# Patient Record
Sex: FEMALE | Race: BLACK OR AFRICAN AMERICAN | Employment: UNEMPLOYED | URBAN - METROPOLITAN AREA
[De-identification: names, ages, dates, MRNs, and addresses within clinical notes are randomized per-mention and may not be internally consistent; named-entity substitution may affect disease eponyms.]

---

## 2017-01-03 ENCOUNTER — APPOINTMENT (OUTPATIENT)
Dept: GENERAL RADIOLOGY | Age: 57
End: 2017-01-03
Attending: PHYSICAL MEDICINE & REHABILITATION
Payer: MEDICAID

## 2017-01-03 ENCOUNTER — SURGERY (OUTPATIENT)
Age: 57
End: 2017-01-03

## 2017-01-05 ENCOUNTER — OFFICE VISIT (OUTPATIENT)
Dept: ORTHOPEDIC SURGERY | Age: 57
End: 2017-01-05

## 2017-01-05 VITALS
SYSTOLIC BLOOD PRESSURE: 155 MMHG | OXYGEN SATURATION: 98 % | TEMPERATURE: 97.7 F | BODY MASS INDEX: 47.09 KG/M2 | WEIGHT: 293 LBS | RESPIRATION RATE: 18 BRPM | DIASTOLIC BLOOD PRESSURE: 78 MMHG | HEIGHT: 66 IN | HEART RATE: 82 BPM

## 2017-01-05 DIAGNOSIS — M54.5 LOW BACK PAIN, UNSPECIFIED BACK PAIN LATERALITY, UNSPECIFIED CHRONICITY, WITH SCIATICA PRESENCE UNSPECIFIED: ICD-10-CM

## 2017-01-05 DIAGNOSIS — D18.00 HEMANGIOMA: Primary | Chronic | ICD-10-CM

## 2017-01-05 DIAGNOSIS — M47.816 FACET ARTHROPATHY, LUMBAR: Chronic | ICD-10-CM

## 2017-01-05 RX ORDER — OXYCODONE AND ACETAMINOPHEN 7.5; 325 MG/1; MG/1
TABLET ORAL
Qty: 60 TAB | Refills: 0 | Status: SHIPPED | OUTPATIENT
Start: 2017-01-05 | End: 2017-02-03 | Stop reason: SDUPTHER

## 2017-01-05 RX ORDER — AZITHROMYCIN 250 MG/1
500 TABLET, FILM COATED ORAL
COMMUNITY
End: 2017-04-26 | Stop reason: ALTCHOICE

## 2017-01-05 RX ORDER — PRAVASTATIN SODIUM 20 MG/1
40 TABLET ORAL
COMMUNITY

## 2017-01-05 RX ORDER — PREDNISONE 5 MG/1
5 TABLET ORAL
COMMUNITY
Start: 2012-01-28 | End: 2017-05-15

## 2017-01-05 RX ORDER — KETOROLAC TROMETHAMINE 15 MG/ML
60 INJECTION, SOLUTION INTRAMUSCULAR; INTRAVENOUS ONCE
Qty: 1 VIAL | Refills: 0
Start: 2017-01-05 | End: 2017-01-05

## 2017-01-05 RX ORDER — BENZONATATE 200 MG/1
CAPSULE ORAL
Refills: 0 | COMMUNITY
Start: 2016-12-02 | End: 2017-04-26 | Stop reason: ALTCHOICE

## 2017-01-05 RX ORDER — HYDROCODONE POLISTIREX AND CHLORPHENIRAMINE POLISTIREX 10; 8 MG/5ML; MG/5ML
2.5 SUSPENSION, EXTENDED RELEASE ORAL
COMMUNITY
Start: 2012-01-28 | End: 2017-04-26 | Stop reason: ALTCHOICE

## 2017-01-05 RX ORDER — ASPIRIN 81 MG/1
81 TABLET ORAL
COMMUNITY

## 2017-01-05 RX ORDER — DIAZEPAM 10 MG/1
TABLET ORAL
Qty: 1 TAB | Refills: 0 | Status: SHIPPED | OUTPATIENT
Start: 2017-01-05 | End: 2017-05-15

## 2017-01-05 RX ORDER — PRAVASTATIN SODIUM 40 MG/1
TABLET ORAL
Refills: 0 | COMMUNITY
Start: 2016-12-07 | End: 2017-05-15

## 2017-01-05 RX ORDER — PREDNISONE 5 MG/ML
5 SOLUTION ORAL
COMMUNITY
End: 2017-05-15

## 2017-01-05 RX ORDER — PREDNISONE 20 MG/1
20 TABLET ORAL
COMMUNITY
End: 2017-05-15

## 2017-01-05 RX ORDER — GLIPIZIDE 2.5 MG/1
2.5 TABLET, EXTENDED RELEASE ORAL
COMMUNITY

## 2017-01-05 RX ORDER — AMLODIPINE BESYLATE 5 MG/1
5 TABLET ORAL
COMMUNITY

## 2017-01-05 RX ORDER — HYDROCHLOROTHIAZIDE 25 MG/1
25 TABLET ORAL
COMMUNITY

## 2017-01-05 NOTE — PROGRESS NOTES
After obtaining consent, and per orders of Dr. John Sanchez, injection of TORADOL 60 MG given by Florinda Castellanos LPN. Patient instructed to report any adverse reaction to me immediately. PT TOLERATED WELL AND WITHOUT INCIDENT.

## 2017-01-05 NOTE — PROGRESS NOTES
Flor Espinal entered per Norberto Moreno as documented on blue sheet:MRI l spine with and w/o contrast  For increase back pain, Toradol 60 mg IM injection, Percocet 7.5 - 325 mg 1 tab PO every day-BID PRN severe pain

## 2017-01-05 NOTE — PROGRESS NOTES
[address]  [address], [address]  [place], [facility] [address]  Phone: [phone]  Fax: [phone]        Emma Hutton  : 1960  PCP: Ruth Buenrostro MD    PROGRESS NOTE      ASSESSMENT AND PLAN    Tim Gordon was seen today for back pain. 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 1.  Progressive low lumbar pain unable to ambulate,  unresponsive to opioids. Need to re-evaluate atypical L5 findings for progression. Informed to not take Diclofenac and Motrin together. 2. DC Tramadol due to no benefit. 3. Lumbar spine MRI. 4. Increase Percocet to 7.5-325 mg  5. Rx for Valium for her MRI. 6.  PCP f/u for DM     Follow-up Disposition: Not on File    Risks and benefits of ongoing opiate therapy have been reviewed with the patient.  is appropriate. UDS results have been consistent. No pain behaviors. Pt has a good risk to benefit ratio which allows the pt to function in a home environment without side effects. HISTORY OF PRESENT ILLNESS  Jose Medrano is a 60 y.o. female. Pt presents to the office for a f/u visit fo rincreasing LBP. She was scheduled for spinal blocks earlier this week  but had to be postponed due to elevated blood sugars. Pt presents to the office today with increasing back pain. Pt denies any specific incident or injury that caused their pain. She notes that for the past couple of weeks her pain has been increasing. She denies doing very much during the holidays to cause her pain to increase. Pt notes that she has been bed ridden due to the intensity of her pain. Pt notes that she is unable to stand, sit, or stand for very long. Pt reports that she has been miserable for the past few weeks without any relief. Pt notes that she has some mild relief while laying down. Her back pain mainly stays in her back but she continues to have RT knee pain. She has a short standing and walking tolerance due to the current intensity of her pain. Her pain is constant throughout the day. She denies any weakness in her BLE. She denies any saddle paresthesia. Pt takes Percocet 5-325 mg BID without much benefit. Pt takes Tramadol 100 mg BID without any benefit. Pt denies any dizziness, confusion, uncontrolled constipation, and cravings due to controlled substances. Pt takes Diclofenac and Motrin without benefit. Denies persistent fevers, chills, weight changes, neurogenic bowel or bladder symptoms. Pt denies recent ED visits or hospitalizations. Pt denies any recent GI ulcers, bleeds or renal dysfucntion. PMHx of DM and hemangioma w/ Atypia at L5    Pain Assessment  1/5/2017   Location of Pain Back   Severity of Pain 7   Quality of Pain Sharp;Dull;Aching   Duration of Pain Persistent   Frequency of Pain Constant   Aggravating Factors Bending;Squatting;Standing;Walking;Stairs   Limiting Behavior Yes   Relieving Factors Rest   Result of Injury No       PAST MEDICAL HISTORY   Past Medical History   Diagnosis Date    Arthritis     Diabetes ([address]     Hypertension     Morbid obesity ([address]     Osteoarthritis of both knees        Past Surgical History   Procedure Laterality Date    Hx gyn       Menopausal   .      MEDICATIONS    Current Outpatient Prescriptions   Medication Sig Dispense Refill    amLODIPine (NORVASC) 5 mg tablet 5 mg.       aspirin delayed-release 81 mg tablet 81 mg.      chlorpheniramine-HYDROcodone (TUSSIONEX PENNKINETIC ER) 10-8 mg/5 mL suspension 2.5 mL.  glipiZIDE SR (GLUCOTROL XL) 2.5 mg CR tablet 2.5 mg.      hydroCHLOROthiazide (HYDRODIURIL) 25 mg tablet 25 mg.  predniSONE (DELTASONE) 20 mg tablet 20 mg.      benzonatate (TESSALON) 200 mg capsule take 1 capsule by mouth three times a day if needed  0    pravastatin (PRAVACHOL) 40 mg tablet   0    gabapentin (NEURONTIN) 100 mg capsule take 1 capsule by mouth three times a day if needed for pain  0    BD INSULIN PEN NEEDLE UF MINI 31 gauge x 3/16\" ndle   0    [START ON 2/15/2017] oxyCODONE-acetaminophen (PERCOCET) 5-325 mg per tablet 1 tab PO every day prn pain 30 Tab 0    traMADol (ULTRAM) 50 mg tablet Take 1-2 Tabs by mouth three (3) times daily as needed for Pain. Max Daily Amount: 300 mg. 180 Tab 2    diclofenac EC (VOLTAREN) 75 mg EC tablet Take 1 Tab by mouth two (2) times a day.  60 Tab 2    gabapentin (NEURONTIN) 300 mg capsule take 1 capsule by mouth three times a day if needed for pain 90 Cap 1    albuterol (PROVENTIL VENTOLIN) 2.5 mg /3 mL (0.083 %) nebulizer solution   0    ipratropium (ATROVENT) 0.02 % nebulizer solution INHALE CONTENTS OF 1 VIAL IN NEBULIZER FOUR TIMES A DAY  0    LANTUS SOLOSTAR 100 unit/mL (3 mL) pen inject 60 units subcutaneously at bedtime  0    terconazole (TERAZOL 3) 0.8 % vaginal cream insert 1 applicatorful vaginally once daily at bedtime for 3 days  0    clotrimazole (LOTRIMIN) 1 % topical cream apply sparingly to affected area twice a day if needed  0    MUCINEX 600 mg ER tablet   0    BYETTA 5 mcg/dose (250 mcg/mL) 1.2 mL injection   0    CONTOUR METER misc use as directed  0    BD INSULIN SYRINGE ULTRA-FINE 0.5 mL 31 gauge x 5/16 syrg USE THREE TIMES DAILY AS DIRECTED  0    BD INSULIN PEN NEEDLE UF ORIG 29 gauge x 1/2\" ndle USE 4 TIMES DAILY  0    ASCENSIA CONTOUR strip USE 1 STRIP 3 TIMES DAILY  0    fluticasone (FLONASE) 50 mcg/actuation nasal spray instill 1 spray into each nostril twice a day  0    polyethylene glycol (MIRALAX) 17 gram/dose powder MIX 17 GM IN  8 OZ OF LIQUID AND DRINL 1 TO 2 TIMES DAILY FOR CONSTIPATION  0    diazepam (VALIUM) 10 mg tablet Take 1 tab PO 30 minutes pre-procedure. 1 Tab 0    NOVOLOG FLEXPEN 100 unit/mL inpn   0    metoprolol succinate (TOPROL-XL) 25 mg XL tablet   0    ADVAIR DISKUS 250-50 mcg/dose diskus inhaler   0    atorvastatin (LIPITOR) 20 mg tablet Take 20 mg by mouth daily. 0    PROAIR HFA 90 mcg/actuation inhaler   0    Aspirin, Buffered 81 mg tab Take  by mouth.  cetirizine (ZYRTEC) 10 mg tablet Take 10 mg by mouth nightly.  montelukast (SINGULAIR) 10 mg tablet Take 10 mg by mouth daily.  ranitidine (ZANTAC) 150 mg tablet Take 150 mg by mouth two (2) times a day.  chlorthalidone (HYGROTEN) 25 mg tablet Take 25 mg by mouth daily.  lisinopril (PRINIVIL, ZESTRIL) 40 mg tablet Take 40 mg by mouth daily.  ibuprofen (MOTRIN IB) 200 mg tablet Take  by mouth.  metFORMIN (GLUCOPHAGE) 500 mg tablet Take 500 mg by mouth two (2) times daily (with meals).  azithromycin (ZITHROMAX) 250 mg tablet 500 mg.  pravastatin (PRAVACHOL) 20 mg tablet 40 mg.  predniSONE (DELTASONE) 5 mg tablet 5 mg.  predniSONE 5 mg/5 mL oral soultion 5 mg. ALLERGIES  Allergies   Allergen Reactions    Aspirin Nausea and Vomiting     itching    Vicodin [Hydrocodone-Acetaminophen] Itching          SOCIAL HISTORY    Social History     Social History    Marital status: UNKNOWN     Spouse name: N/A    Number of children: N/A    Years of education: N/A     Occupational History    Not on file.      Social History Main Topics    Smoking status: Former Smoker    Smokeless tobacco: Never Used    Alcohol use No    Drug use: No    Sexual activity: Not Currently     Other Topics Concern    Not on file     Social History Narrative    ** Merged History Encounter **            FAMILY HISTORY  Family History   Problem Relation Age of Onset    Diabetes Mother     Arthritis-rheumatoid Mother        REVIEW OF SYSTEMS  Review of Systems   Constitutional: Negative for chills, fever and weight loss. Respiratory: Negative for shortness of breath. Cardiovascular: Negative for chest pain. Gastrointestinal: Negative for constipation. Negative for fecal incontinence   Genitourinary: Negative for dysuria. Negative for urinary incontinence   Musculoskeletal:        Per HPI   Skin: Negative for rash. Neurological: Negative for dizziness, tingling, tremors, focal weakness and headaches. Endo/Heme/Allergies: Does not bruise/bleed easily. Psychiatric/Behavioral: The patient does not have insomnia. PHYSICAL EXAMINATION  Visit Vitals    /78    Pulse 82    Temp 97.7 °F (36.5 °C) (Oral)    Resp 18    Ht 5' 6\" (1.676 m)    Wt 300 lb (136.1 kg)    SpO2 98%    BMI 48.42 kg/m2         Accompanied by self. Constitutional:  Well developed, well nourished, in no acute distress. Psychiatric: Affect and mood are appropriate. Integumentary: No rashes or abrasions noted on exposed areas. Cardiovascular/Peripheral Vascular: Intact l pulses. No peripheral edema is noted. Lymphatic:  No evidence of lymphedema. No cervical lymphadenopathy. SPINE/MUSCULOSKELETAL EXAM    Lumbar spine:  No rash, ecchymosis, or gross obliquity. No fasciculations. No focal atrophy is noted. Tenderness to palpation L5-S1. Tenderness to palpation of bilateral PSIS. No tenderness to palpation at the sciatic notch. SI joints non-tender. Trochanters non tender. Sensation grossly intact to light touch. MOTOR:       Hip Flex  Quads Hamstrings Ankle DF EHL Ankle PF   Right +4/5 +4/5 +4/5 +4/5 +4/5 +4/5   Left +4/5 +4/5 +4/5 +4/5 +4/5 +4/5     Straight Leg raise negative. Ambulation without assistive device. FWB.       Written by Naye Sanabria as dictated by Gaynel Favre, MD.    Ms. Gray Puckett may have a reminder for a \"due or due soon\" health maintenance. I have asked that she contact her primary care provider for follow-up on this health maintenance.

## 2017-01-12 RX ORDER — GABAPENTIN 300 MG/1
CAPSULE ORAL
Qty: 90 CAP | Refills: 1 | OUTPATIENT
Start: 2017-01-12

## 2017-01-12 RX ORDER — GABAPENTIN 300 MG/1
300 CAPSULE ORAL 3 TIMES DAILY
Qty: 90 CAP | Refills: 2 | Status: SHIPPED | OUTPATIENT
Start: 2017-01-12 | End: 2017-04-04 | Stop reason: SDUPTHER

## 2017-01-12 NOTE — TELEPHONE ENCOUNTER
Order entered and sent to pharmacy on file per BLACK Samson. Called patient and left message on unidentified voice mail, informing patient that Rx was approved and sent to pharmacy on file and to call facility with any further questions. No further action required.

## 2017-01-12 NOTE — TELEPHONE ENCOUNTER
Patient was last seen by Dr. Mercedes Cisneros on 01/05/2017,   Follow up on 03/02/2017 with Dr. Mercedes Cisneros. Gabapentin last prescribed by Juan Young on 10/06/2016. Please review and advise.

## 2017-02-01 DIAGNOSIS — M47.816 FACET ARTHROPATHY, LUMBAR: Chronic | ICD-10-CM

## 2017-02-01 NOTE — TELEPHONE ENCOUNTER
Patient wants MRI done at [facility]. Please  fax order to [facility]. Also need refill of Tramadol and her Percocet. Patient  at [facility] [place].  She can be reached at [phone]

## 2017-02-02 ENCOUNTER — DOCUMENTATION ONLY (OUTPATIENT)
Dept: ORTHOPEDIC SURGERY | Age: 57
End: 2017-02-02

## 2017-02-02 RX ORDER — TRAMADOL HYDROCHLORIDE 50 MG/1
50-100 TABLET ORAL
Qty: 180 TAB | Refills: 2 | OUTPATIENT
Start: 2017-02-02

## 2017-02-02 RX ORDER — OXYCODONE AND ACETAMINOPHEN 5; 325 MG/1; MG/1
TABLET ORAL
Qty: 30 TAB | Refills: 0 | OUTPATIENT
Start: 2017-03-14

## 2017-02-02 NOTE — PROGRESS NOTES
MRI Lumbar Spine w and w/o is scheduled at [facility], [phone], on 02/21/17, arrive 10:30AM, test 11:00AM. [facility] pre-authorization is [id number], effective 02/20/17-04/06/17

## 2017-02-02 NOTE — TELEPHONE ENCOUNTER
Please see documentation regarding refill. Pt calling for status update.  pls call pt she has to make transportation arrangements

## 2017-02-02 NOTE — TELEPHONE ENCOUNTER
According to Dr Azalea Brooks last OV note, the tramadol has been d/c'ed and the percocet was increased to 7.5/325  #60. Please pend up the correct dose. Also, she was given a rx for percocet 5/325 #30 on 12/1/16 with dnf date 2/15/17. Please have her bring that rx back when she picks up the new rx.

## 2017-02-02 NOTE — TELEPHONE ENCOUNTER
Kayla, could you please assist with sending this patient's order to [facility] 112 is out for the remainder of the week and this patient's MRI order has been in the system for a little bit already. Thanks so much.

## 2017-02-03 RX ORDER — OXYCODONE AND ACETAMINOPHEN 7.5; 325 MG/1; MG/1
TABLET ORAL
Qty: 60 TAB | Refills: 0 | Status: SHIPPED | OUTPATIENT
Start: 2017-02-03 | End: 2017-02-28 | Stop reason: SDUPTHER

## 2017-02-03 RX ORDER — OXYCODONE AND ACETAMINOPHEN 7.5; 325 MG/1; MG/1
TABLET ORAL
Qty: 60 TAB | Refills: 0 | Status: CANCELLED | OUTPATIENT
Start: 2017-02-03

## 2017-02-03 NOTE — TELEPHONE ENCOUNTER
Pt has only been seen by Dr. Rebecca España and BLACK Mxi.  RX Percocet 7.5-325 mg pend for approval.

## 2017-02-03 NOTE — TELEPHONE ENCOUNTER
Patient called again to check status of refill request - please advise as soon as possible at [phone]

## 2017-02-03 NOTE — TELEPHONE ENCOUNTER
Per CC, last RX Percocet given was Percocet 7.5-325mg 1 tab PO every day-bid #60 written on last office visit of 01/05/17 with this start date, which would mean pt is about to run out of this med. Pt was given RX Percocet 5-325mg on 12/01/16 with DNF 02/15/17 prior to increase at 01/05/17 office visit. Called pt and she states she would rather wait for the Percocet 7.5-325mg refill script to be signed instead of getting the Percocet 5-325mg filled. Would you like a vorb done to re-order Percocet 7.5-325mg and place in cabinet at [facility] #1 for signature on Monday?

## 2017-02-03 NOTE — TELEPHONE ENCOUNTER
VENANCIOB ENTERED PER DR. Chance Castellanos FOR RE-ORDER OF PERCOCET 7.5-325 MG AS LAST WRITTEN. Placed in cabinet at [facility] #1 for signature. Pt to be called for  once signed.

## 2017-02-03 NOTE — TELEPHONE ENCOUNTER
Not sure how/why she was given a 5/325 and a 7.5/325 Rx for Percocet last visit. We can call her pharmacy and authorize her to get her #30 5/325 Percocet filled today and I will sign the RF of 7.5 on Monday.

## 2017-02-06 NOTE — TELEPHONE ENCOUNTER
Called patient to inform her that RX is at the front ready for . Patient verbalized understanding and will come to the office to .

## 2017-02-22 ENCOUNTER — TELEPHONE (OUTPATIENT)
Dept: ORTHOPEDIC SURGERY | Age: 57
End: 2017-02-22

## 2017-02-22 DIAGNOSIS — M47.816 FACET ARTHROPATHY, LUMBAR: Chronic | ICD-10-CM

## 2017-02-22 NOTE — TELEPHONE ENCOUNTER
Pt requesting a RX for her Percocet and Tramadol because she will be out soon and her MRI is not scheduled until mid April due to her insurance pushing it back. Please advise pt at [phone].

## 2017-02-23 NOTE — TELEPHONE ENCOUNTER
Patient checking on meds request for March and April MRI is not till 04/21 Felix White. Also has issues with transportation to get to office.  Please call her today at [phone]

## 2017-02-27 NOTE — TELEPHONE ENCOUNTER
02/27/17 patient checking as she has not heard from the office ref her med request of 02/22/17.   Please call her today at [phone]

## 2017-02-28 RX ORDER — OXYCODONE AND ACETAMINOPHEN 7.5; 325 MG/1; MG/1
TABLET ORAL
Qty: 60 TAB | Refills: 0 | Status: SHIPPED | OUTPATIENT
Start: 2017-02-28 | End: 2017-03-27 | Stop reason: SDUPTHER

## 2017-02-28 NOTE — TELEPHONE ENCOUNTER
Pt called for status update of medication request from a week ago. Pt states she still has a few pills left, but she needs to make transportation arrgmnts now to get here.

## 2017-02-28 NOTE — TELEPHONE ENCOUNTER
Spoke with patient, stated MRI is not until April because that is when her insurance was willing to cover it. Stated if she the MRI prior to the middle of April she would have to pay out of pocket. Rx for Percocet 7.5mg has been printed and will be signed on 3/1/17 by Dr. Cooper Vasquez.  Patient will be notified when ready for pick-up

## 2017-02-28 NOTE — TELEPHONE ENCOUNTER
This message was routed to [facility] on 02/22/17 but no response can you please advise patient has called for status update.

## 2017-02-28 NOTE — TELEPHONE ENCOUNTER
Why is her MRI scheduled so far out in April ? She should be able to get in for sooner study. Ok to fill Percocet 7.5 one po every day-bid prn severe pain. #60. Last visit she reported that the Tramadol was not helping at all, so she does not need to take it since the Percocet is much stronger.

## 2017-03-27 DIAGNOSIS — M47.816 FACET ARTHROPATHY, LUMBAR: Chronic | ICD-10-CM

## 2017-03-27 RX ORDER — OXYCODONE AND ACETAMINOPHEN 7.5; 325 MG/1; MG/1
TABLET ORAL
Qty: 60 TAB | Refills: 0 | Status: SHIPPED | OUTPATIENT
Start: 2017-03-30 | End: 2017-04-26 | Stop reason: SDUPTHER

## 2017-03-27 NOTE — TELEPHONE ENCOUNTER
Last Visit: 2017 with MD Jalen Martinez    Next Appointment: 2017 with MD Jalen Martinez   Previous Refill Encounters: 2017 per MD Jalen Martinez #60     Requested Prescriptions     Pending Prescriptions Disp Refills    oxyCODONE-acetaminophen (PERCOCET) 7.5-325 mg per tablet 60 Tab 0     Si tab PO every day-BID PRN severe pain . ..  DO NOT FILL BEFORE 2017

## 2017-03-29 NOTE — TELEPHONE ENCOUNTER
Patient checking on when she can get her prescription requested 03/27/17.   Please call her back at [phone]

## 2017-04-06 ENCOUNTER — TELEPHONE (OUTPATIENT)
Dept: ORTHOPEDIC SURGERY | Age: 57
End: 2017-04-06

## 2017-04-06 ENCOUNTER — DOCUMENTATION ONLY (OUTPATIENT)
Dept: ORTHOPEDIC SURGERY | Age: 57
End: 2017-04-06

## 2017-04-06 DIAGNOSIS — M47.816 FACET ARTHROPATHY, LUMBAR: Chronic | ICD-10-CM

## 2017-04-06 NOTE — TELEPHONE ENCOUNTER
Last office visit 01/05/2017 with Dr. Hilda Florence. Per [facility], pt last Percocet 7.5-325 mg on 03/06/17 written on 02/28/17 #60/30-day supply. Per CC RX written 03/27/17 with DNF 03/30/17. Please double check.

## 2017-04-06 NOTE — TELEPHONE ENCOUNTER
MRI Lumbar spine w and w/o is scheduled at [facility] on 04/29/17. (The previous scheduling had to be cancelled because [facility] was not going to cover the MRI. She was required to wait an add'l 3 months before she could proceed with the test.)    Ms. Moriah Edward is scheduled for her follow-up appointment with Dr. Armando Howard on 05/15/17. She is aware this is an early request but she would like to have her Percocet for May approved for a refill due to her return date. She states she has another prescription she will be picking up and would like to  the May request as well. (As a side note, Ms. Moriah Edward in enrolled in school and is limited with travel opportunities due to the class hours.) Please call patient to advise. Thank you.

## 2017-04-06 NOTE — PROGRESS NOTES
MRI Lumbar spine w and w/o is scheduled at [facility], [phone], on 04/29/17, arrive 9:00AM, test 9:30AM. [facility] pre-authorization is [id number], effective 04/26/17-06/10/17

## 2017-04-07 NOTE — TELEPHONE ENCOUNTER
Patient returned call to us regarding medication - states she's going to run out of medication prior to upcoming appointment 5/15 and we were not able to offer her appointment prior to this date. Will she be able to get medication prior to this date?

## 2017-04-10 NOTE — TELEPHONE ENCOUNTER
According to the chart, the pt cancelled her 3/2/17 and 4/26/17 appts with Dr Lexy Jacobo.   We cannot give her any opioids for May as she was last seen in Jan.

## 2017-04-10 NOTE — TELEPHONE ENCOUNTER
Called pt and informed of NP Michelle Connors's message. Per pt, she cancelled the 04/26/2017 appt due to insurance not paying for MRI so she did not see the need to keep the appt since the MRI would not be done prior to that date. Pt asked to see NP for medication refill. Transferred to  to assist pt with scheduling with NP's next available.

## 2017-04-17 ENCOUNTER — HOSPITAL ENCOUNTER (EMERGENCY)
Age: 57
Discharge: HOME | End: 2017-04-17
Attending: INTERNAL MEDICINE | Admitting: INTERNAL MEDICINE
Payer: MEDICAID

## 2017-04-17 VITALS
BODY MASS INDEX: 45.99 KG/M2 | OXYGEN SATURATION: 99 % | TEMPERATURE: 98.6 F | RESPIRATION RATE: 16 BRPM | DIASTOLIC BLOOD PRESSURE: 88 MMHG | HEART RATE: 80 BPM | SYSTOLIC BLOOD PRESSURE: 175 MMHG | WEIGHT: 293 LBS | HEIGHT: 67 IN

## 2017-04-17 DIAGNOSIS — M25.561 CHRONIC PAIN OF BOTH KNEES: ICD-10-CM

## 2017-04-17 DIAGNOSIS — M25.562 CHRONIC PAIN OF BOTH KNEES: ICD-10-CM

## 2017-04-17 DIAGNOSIS — M54.50 CHRONIC MIDLINE LOW BACK PAIN WITHOUT SCIATICA: Primary | ICD-10-CM

## 2017-04-17 DIAGNOSIS — G89.29 CHRONIC MIDLINE LOW BACK PAIN WITHOUT SCIATICA: Primary | ICD-10-CM

## 2017-04-17 DIAGNOSIS — G89.29 CHRONIC PAIN OF BOTH KNEES: ICD-10-CM

## 2017-04-17 PROCEDURE — 99282 EMERGENCY DEPT VISIT SF MDM: CPT

## 2017-04-17 PROCEDURE — 74011250637 HC RX REV CODE- 250/637: Performed by: INTERNAL MEDICINE

## 2017-04-17 PROCEDURE — 74011250636 HC RX REV CODE- 250/636: Performed by: INTERNAL MEDICINE

## 2017-04-17 PROCEDURE — 96372 THER/PROPH/DIAG INJ SC/IM: CPT

## 2017-04-17 RX ORDER — KETOROLAC TROMETHAMINE 30 MG/ML
60 INJECTION, SOLUTION INTRAMUSCULAR; INTRAVENOUS
Status: COMPLETED | OUTPATIENT
Start: 2017-04-17 | End: 2017-04-17

## 2017-04-17 RX ORDER — DEXAMETHASONE SODIUM PHOSPHATE 4 MG/ML
10 INJECTION, SOLUTION INTRA-ARTICULAR; INTRALESIONAL; INTRAMUSCULAR; INTRAVENOUS; SOFT TISSUE
Status: COMPLETED | OUTPATIENT
Start: 2017-04-17 | End: 2017-04-17

## 2017-04-17 RX ADMIN — DEXAMETHASONE SODIUM PHOSPHATE 10 MG: 4 INJECTION, SOLUTION INTRAMUSCULAR; INTRAVENOUS at 13:36

## 2017-04-17 RX ADMIN — KETOROLAC TROMETHAMINE 60 MG: 30 INJECTION, SOLUTION INTRAMUSCULAR at 13:36

## 2017-04-17 NOTE — DISCHARGE INSTRUCTIONS
Back Pain: Care Instructions  Your Care Instructions    Back pain has many possible causes. It is often related to problems with muscles and ligaments of the back. It may also be related to problems with the nerves, discs, or bones of the back. Moving, lifting, standing, sitting, or sleeping in an awkward way can strain the back. Sometimes you don't notice the injury until later. Arthritis is another common cause of back pain. Although it may hurt a lot, back pain usually improves on its own within several weeks. Most people recover in 12 weeks or less. Using good home treatment and being careful not to stress your back can help you feel better sooner. Follow-up care is a key part of your treatment and safety. Be sure to make and go to all appointments, and call your doctor if you are having problems. Its also a good idea to know your test results and keep a list of the medicines you take. How can you care for yourself at home? · Sit or lie in positions that are most comfortable and reduce your pain. Try one of these positions when you lie down:  ¨ Lie on your back with your knees bent and supported by large pillows. ¨ Lie on the floor with your legs on the seat of a sofa or chair. Rosealee Highman on your side with your knees and hips bent and a pillow between your legs. ¨ Lie on your stomach if it does not make pain worse. · Do not sit up in bed, and avoid soft couches and twisted positions. Bed rest can help relieve pain at first, but it delays healing. Avoid bed rest after the first day of back pain. · Change positions every 30 minutes. If you must sit for long periods of time, take breaks from sitting. Get up and walk around, or lie in a comfortable position. · Try using a heating pad on a low or medium setting for 15 to 20 minutes every 2 or 3 hours. Try a warm shower in place of one session with the heating pad. · You can also try an ice pack for 10 to 15 minutes every 2 to 3 hours.  Put a thin cloth between the ice pack and your skin. · Take pain medicines exactly as directed. ¨ If the doctor gave you a prescription medicine for pain, take it as prescribed. ¨ If you are not taking a prescription pain medicine, ask your doctor if you can take an over-the-counter medicine. · Take short walks several times a day. You can start with 5 to 10 minutes, 3 or 4 times a day, and work up to longer walks. Walk on level surfaces and avoid hills and stairs until your back is better. · Return to work and other activities as soon as you can. Continued rest without activity is usually not good for your back. · To prevent future back pain, do exercises to stretch and strengthen your back and stomach. Learn how to use good posture, safe lifting techniques, and proper body mechanics. When should you call for help? Call your doctor now or seek immediate medical care if:  · You have new or worsening numbness in your legs. · You have new or worsening weakness in your legs. (This could make it hard to stand up.)  · You lose control of your bladder or bowels. Watch closely for changes in your health, and be sure to contact your doctor if:  · Your pain gets worse. · You are not getting better after 2 weeks. Where can you learn more? Go to http://thuan-javon.info/. Enter O355 in the search box to learn more about \"Back Pain: Care Instructions. \"  Current as of: May 23, 2016  Content Version: 11.2  © 1008-9593 [employer]. Care instructions adapted under license by [employer] (which disclaims liability or warranty for this information). If you have questions about a medical condition or this instruction, always ask your healthcare professional. [address] any warranty or liability for your use of this information.

## 2017-04-17 NOTE — ED PROVIDER NOTES
HPI Comments: 1:15 PM Valentin Malagon is a 60 y.o. female with a history of HTN, Arthritis, Diabetes, and Osteoarhritis who presents to the emergency department c/o lower back pain . The patient explains she has a MRI on 4/28 and sees her doctor 4/15 for the pain. Pt also c/o chronic bilateral knee pain. Pt states that she takes Motrin and Tramadol with no relief. Pt rates the pain 9/10 in severity. No other concerns at this time. PCP: Dawson Chung MD      The history is provided by the patient. Past Medical History:   Diagnosis Date    Arthritis     Diabetes ([facility]     Hypertension     Morbid obesity ([facility]     Osteoarthritis of both knees        Past Surgical History:   Procedure Laterality Date    HX GYN      Menopausal         Family History:   Problem Relation Age of Onset    Diabetes Mother     Arthritis-rheumatoid Mother        Social History     Social History    Marital status: UNKNOWN     Spouse name: N/A    Number of children: N/A    Years of education: N/A     Occupational History    Not on file. Social History Main Topics    Smoking status: Former Smoker    Smokeless tobacco: Never Used    Alcohol use No    Drug use: No    Sexual activity: Not Currently     Other Topics Concern    Not on file     Social History Narrative    ** Merged History Encounter **              ALLERGIES: Aspirin and Vicodin [hydrocodone-acetaminophen]    Review of Systems   Constitutional: Negative for chills, fatigue, fever and unexpected weight change. HENT: Negative for congestion and rhinorrhea. Respiratory: Negative for chest tightness and shortness of breath. Cardiovascular: Negative for chest pain, palpitations and leg swelling. Gastrointestinal: Negative for abdominal pain, nausea and vomiting. Genitourinary: Negative for dysuria. Musculoskeletal: Positive for arthralgias (bilateral knee) and back pain. Skin: Negative for rash.    Neurological: Negative for dizziness and weakness. Psychiatric/Behavioral: The patient is not nervous/anxious. All other systems reviewed and are negative. There were no vitals filed for this visit. Physical Exam   Constitutional: She is oriented to person, place, and time. She appears well-developed and well-nourished. No distress. HENT:   Head: Normocephalic and atraumatic. Right Ear: External ear normal.   Left Ear: External ear normal.   Nose: Nose normal.   Mouth/Throat: Oropharynx is clear and moist.   Eyes: Conjunctivae and EOM are normal. Pupils are equal, round, and reactive to light. No scleral icterus. Neck: Normal range of motion. Neck supple. No JVD present. No tracheal deviation present. No thyromegaly present. Cardiovascular: Normal rate, regular rhythm, normal heart sounds and intact distal pulses. Exam reveals no gallop and no friction rub. No murmur heard. Pulmonary/Chest: Effort normal and breath sounds normal. She exhibits no tenderness. Abdominal: Soft. Bowel sounds are normal. She exhibits no distension. There is no tenderness. There is no rebound and no guarding. Musculoskeletal: Normal range of motion. She exhibits no edema or tenderness. Patellar crepitus and cracking with movement in both knees; no effusion or instability. TTP lumbar  Spine with good distal PMS to both lower extremities. Lymphadenopathy:     She has no cervical adenopathy. Neurological: She is alert and oriented to person, place, and time. No cranial nerve deficit. Coordination normal.   No sensory loss, Gait normal, Motor 5/5   Skin: Skin is warm and dry. Psychiatric: She has a normal mood and affect. Her behavior is normal. Judgment and thought content normal.   Nursing note and vitals reviewed.        [facility]  ED Course       Procedures      Vitals:  Patient Vitals for the past 12 hrs:   Temp Pulse Resp BP SpO2   04/17/17 1312 98.2 °F (36.8 °C) 85 14 (!) 183/97 99 %         Medications ordered:   Medications - No data to display      Lab findings:  No results found for this or any previous visit (from the past 12 hour(s)). EKG interpretation by ED Physician:       X-Ray, CT or other radiology findings or impressions:  No orders to display       Orders:  No orders of the defined types were placed in this encounter. Progress notes, Consult notes, or additional Procedure notes:    Patient was discharged in stable condition. Patient is to return to emergency department if any new or worsening condition. Disposition:  ED DIAGNOSIS & DISPOSITION INFORMATION  Diagnosis:   1. Chronic midline low back pain without sciatica    2. Chronic pain of both knees          Disposition: home    Follow-up Information     Follow up With Details Comments Contact Info    Gia Espino MD Schedule an appointment as soon as possible for a visit ED visit follow up [address] [place] [address] EMERGENCY DEPT Go to As needed, If symptoms worsen [address]  [phone]          Discharge Medication List as of 4/17/2017  1:20 PM      CONTINUE these medications which have NOT CHANGED    Details   !! gabapentin (NEURONTIN) 300 mg capsule take 1 capsule by mouth three times a day, Normal, Disp-90 Cap, R-1      oxyCODONE-acetaminophen (PERCOCET) 7.5-325 mg per tablet 1 tab PO every day-BID PRN severe pain . ..  DO NOT FILL BEFORE 03/30/2017, Print, Disp-60 Tab, R-0      amLODIPine (NORVASC) 5 mg tablet 5 mg., Historical Med      aspirin delayed-release 81 mg tablet 81 mg., Historical Med      azithromycin (ZITHROMAX) 250 mg tablet 500 mg., Historical Med      chlorpheniramine-HYDROcodone (TUSSIONEX PENNKINETIC ER) 10-8 mg/5 mL suspension 2.5 mL., Historical Med      glipiZIDE SR (GLUCOTROL XL) 2.5 mg CR tablet 2.5 mg., Historical Med      hydroCHLOROthiazide (HYDRODIURIL) 25 mg tablet 25 mg., Historical Med      !! pravastatin (PRAVACHOL) 20 mg tablet 40 mg., Historical Med      !! predniSONE (DELTASONE) 20 mg tablet 20 mg., Historical Med      !! predniSONE (DELTASONE) 5 mg tablet 5 mg., Historical Med      predniSONE 5 mg/5 mL oral soultion 5 mg., Historical Med      benzonatate (TESSALON) 200 mg capsule take 1 capsule by mouth three times a day if needed, Historical Med, R-0      !! pravastatin (PRAVACHOL) 40 mg tablet Historical Med, R-0      !! diazePAM (VALIUM) 10 mg tablet Take 30 minutes prior to procedure, Print, Disp-1 Tab, R-0      !! gabapentin (NEURONTIN) 100 mg capsule take 1 capsule by mouth three times a day if needed for pain, Historical Med, R-0      BD INSULIN PEN NEEDLE UF MINI 31 gauge x 3/16\" ndle Historical Med, R-0, KIERA      diclofenac EC (VOLTAREN) 75 mg EC tablet Take 1 Tab by mouth two (2) times a day., Normal, Disp-60 Tab, R-2      albuterol (PROVENTIL VENTOLIN) 2.5 mg /3 mL (0.083 %) nebulizer solution Historical Med, R-0      ipratropium (ATROVENT) 0.02 % nebulizer solution INHALE CONTENTS OF 1 VIAL IN NEBULIZER FOUR TIMES A DAY, Historical Med, R-0      LANTUS SOLOSTAR 100 unit/mL (3 mL) pen inject 60 units subcutaneously at bedtime, Historical Med, R-0, KIERA      terconazole (TERAZOL 3) 0.8 % vaginal cream insert 1 applicatorful vaginally once daily at bedtime for 3 days, R-0, Historical Med      clotrimazole (LOTRIMIN) 1 % topical cream apply sparingly to affected area twice a day if needed, Historical Med, R-0      MUCINEX 600 mg ER tablet Historical Med, R-0, KIERA      BYETTA 5 mcg/dose (250 mcg/mL) 1.2 mL injection Historical Med, R-0, KIERA      CONTOUR METER misc use as directed, Historical Med, R-0, KIERA      BD INSULIN SYRINGE ULTRA-FINE 0.5 mL 31 gauge x 5/16 syrg USE THREE TIMES DAILY AS DIRECTED, Historical Med, R-0, KIERA      BD INSULIN PEN NEEDLE UF ORIG 29 gauge x 1/2\" ndle USE 4 TIMES DAILY, Historical Med, R-0, KIERA      ASCENSIA CONTOUR strip USE 1 STRIP 3 TIMES DAILY, Historical Med, R-0, KIERA      fluticasone (FLONASE) 50 mcg/actuation nasal spray instill 1 spray into each nostril twice a day, Historical Med, R-0      polyethylene glycol (MIRALAX) 17 gram/dose powder MIX 17 GM IN  8 OZ OF LIQUID AND DRINL 1 TO 2 TIMES DAILY FOR CONSTIPATION, Historical Med, R-0      !! diazepam (VALIUM) 10 mg tablet Take 1 tab PO 30 minutes pre-procedure., Phone In, Disp-1 Tab, R-0      NOVOLOG FLEXPEN 100 unit/mL inpn Historical Med, R-0, KIERA      metoprolol succinate (TOPROL-XL) 25 mg XL tablet Historical Med, R-0      ADVAIR DISKUS 250-50 mcg/dose diskus inhaler Historical Med, R-0, KIERA      atorvastatin (LIPITOR) 20 mg tablet Take 20 mg by mouth daily. , Historical Med, R-0      PROAIR HFA 90 mcg/actuation inhaler Historical Med, R-0, KIERA      Aspirin, Buffered 81 mg tab Take  by mouth., Historical Med      cetirizine (ZYRTEC) 10 mg tablet Take 10 mg by mouth nightly., Historical Med      montelukast (SINGULAIR) 10 mg tablet Take 10 mg by mouth daily. , Historical Med      ranitidine (ZANTAC) 150 mg tablet Take 150 mg by mouth two (2) times a day., Historical Med      chlorthalidone (HYGROTEN) 25 mg tablet Take 25 mg by mouth daily. , Historical Med      lisinopril (PRINIVIL, ZESTRIL) 40 mg tablet Take 40 mg by mouth daily. , Historical Med      ibuprofen (MOTRIN IB) 200 mg tablet Take  by mouth., Historical Med      metFORMIN (GLUCOPHAGE) 500 mg tablet Take 500 mg by mouth two (2) times daily (with meals). , Historical Med       !! - Potential duplicate medications found. Please discuss with provider. Follow-up Information     None          Scribe Attestation  Odessa Carranza scribing for and in presence of Elisabeth Baker MD (04/17/17)      Physician Attestation  I personally preformed the services described in this documentation, reviewed and edited the documentation which was dictated to the scribe in my presence, and it accurately records my words and actions.      Elisabeth Baker MD (04/17/17)      Signed by: Odessa Carranza, Armando, 04/17/17, 1:15 PM

## 2017-04-26 ENCOUNTER — OFFICE VISIT (OUTPATIENT)
Dept: ORTHOPEDIC SURGERY | Age: 57
End: 2017-04-26

## 2017-04-26 VITALS
BODY MASS INDEX: 45.99 KG/M2 | SYSTOLIC BLOOD PRESSURE: 139 MMHG | WEIGHT: 293 LBS | HEART RATE: 80 BPM | DIASTOLIC BLOOD PRESSURE: 82 MMHG | HEIGHT: 67 IN

## 2017-04-26 DIAGNOSIS — G89.29 CHRONIC BILATERAL LOW BACK PAIN WITHOUT SCIATICA: ICD-10-CM

## 2017-04-26 DIAGNOSIS — M54.50 CHRONIC BILATERAL LOW BACK PAIN WITHOUT SCIATICA: ICD-10-CM

## 2017-04-26 DIAGNOSIS — M47.816 FACET ARTHROPATHY, LUMBAR: Chronic | ICD-10-CM

## 2017-04-26 DIAGNOSIS — Z51.81 THERAPEUTIC DRUG MONITORING: ICD-10-CM

## 2017-04-26 DIAGNOSIS — M47.816 FACET ARTHROPATHY, LUMBAR: Primary | Chronic | ICD-10-CM

## 2017-04-26 RX ORDER — OXYCODONE AND ACETAMINOPHEN 7.5; 325 MG/1; MG/1
1 TABLET ORAL
Qty: 60 TAB | Refills: 0 | Status: SHIPPED | OUTPATIENT
Start: 2017-05-02 | End: 2017-05-15 | Stop reason: SDUPTHER

## 2017-04-26 RX ORDER — DICLOFENAC SODIUM 75 MG/1
75 TABLET, DELAYED RELEASE ORAL 2 TIMES DAILY
Qty: 60 TAB | Refills: 2 | Status: SHIPPED | OUTPATIENT
Start: 2017-04-26 | End: 2017-08-12 | Stop reason: SDUPTHER

## 2017-04-26 NOTE — PATIENT INSTRUCTIONS
Back Pain: Care Instructions  Your Care Instructions    Back pain has many possible causes. It is often related to problems with muscles and ligaments of the back. It may also be related to problems with the nerves, discs, or bones of the back. Moving, lifting, standing, sitting, or sleeping in an awkward way can strain the back. Sometimes you don't notice the injury until later. Arthritis is another common cause of back pain. Although it may hurt a lot, back pain usually improves on its own within several weeks. Most people recover in 12 weeks or less. Using good home treatment and being careful not to stress your back can help you feel better sooner. Follow-up care is a key part of your treatment and safety. Be sure to make and go to all appointments, and call your doctor if you are having problems. Its also a good idea to know your test results and keep a list of the medicines you take. How can you care for yourself at home? · Sit or lie in positions that are most comfortable and reduce your pain. Try one of these positions when you lie down:  ¨ Lie on your back with your knees bent and supported by large pillows. ¨ Lie on the floor with your legs on the seat of a sofa or chair. Donnise Falter on your side with your knees and hips bent and a pillow between your legs. ¨ Lie on your stomach if it does not make pain worse. · Do not sit up in bed, and avoid soft couches and twisted positions. Bed rest can help relieve pain at first, but it delays healing. Avoid bed rest after the first day of back pain. · Change positions every 30 minutes. If you must sit for long periods of time, take breaks from sitting. Get up and walk around, or lie in a comfortable position. · Try using a heating pad on a low or medium setting for 15 to 20 minutes every 2 or 3 hours. Try a warm shower in place of one session with the heating pad. · You can also try an ice pack for 10 to 15 minutes every 2 to 3 hours.  Put a thin cloth between the ice pack and your skin. · Take pain medicines exactly as directed. ¨ If the doctor gave you a prescription medicine for pain, take it as prescribed. ¨ If you are not taking a prescription pain medicine, ask your doctor if you can take an over-the-counter medicine. · Take short walks several times a day. You can start with 5 to 10 minutes, 3 or 4 times a day, and work up to longer walks. Walk on level surfaces and avoid hills and stairs until your back is better. · Return to work and other activities as soon as you can. Continued rest without activity is usually not good for your back. · To prevent future back pain, do exercises to stretch and strengthen your back and stomach. Learn how to use good posture, safe lifting techniques, and proper body mechanics. When should you call for help? Call your doctor now or seek immediate medical care if:  · You have new or worsening numbness in your legs. · You have new or worsening weakness in your legs. (This could make it hard to stand up.)  · You lose control of your bladder or bowels. Watch closely for changes in your health, and be sure to contact your doctor if:  · Your pain gets worse. · You are not getting better after 2 weeks. Where can you learn more? Go to http://tuhan-javon.info/. Enter N249 in the search box to learn more about \"Back Pain: Care Instructions. \"  Current as of: May 23, 2016  Content Version: 11.2  © 2191-7582 [employer]. Care instructions adapted under license by [employer] (which disclaims liability or warranty for this information). If you have questions about a medical condition or this instruction, always ask your healthcare professional. [address] any warranty or liability for your use of this information.

## 2017-04-26 NOTE — PROGRESS NOTES
Chief complaint/History of Present Illness:  Chief Complaint   Patient presents with    Follow-up     lower back pain     HPI  Sue Mccormack is a  60 y.o.  female      HISTORY OF PRESENT ILLNESS:  The patient comes in today for followup of her chronic low back pain. She was last seen by Dr. Judeth Boas on January 6, 2017. At that time, she was ordered an MRI of her lumbar spine to evaluate a hemangioma with atypia at L5. She has not been able to get the MRI done yet, but it is scheduled for April 28, 2017, and she plans on doing that. She could not get it done earlier due to insurance reasons per this patient. She continues to have low back pain without any leg pain. She denies any buttock or leg pain, but she does have progressively increasing low back pain. 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 She continues to rate her pain as an 8-9/10 without Percocet. With Percocet 7.5/325 mg it does come down to where she is able to function and do activities, but she finds she is having to take it two to three times a day instead of just twice a day. She is also taking Diclofenac 75 mg, but she is only taking it once a day instead of twice a day and Gabapentin 300 mg three times a day, she is only taking one to two times a day. She denies any fever or bowel or bladder dysfunction. She is a nonsmoker. PHYSICAL EXAM:  Ms. Phil Goodwin is a 75-year-old female. She is alert and oriented. She has difficulty getting from sitting to standing. She has an antalgic gait and uses no assistive devices. She has pain with hyperextension of the lumbar spine. She has 4/5 strength of the bilateral lower extremities and negative straight leg raise. 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 We will be getting a new MRI on Friday to reevaluate this. She also has low back pain without sciatica today and facet arthropathy.   She has been ordered facet blocks in the past and was unable to do it due to her diabetes, but she states her blood sugar was running 109 when she checked it yesterday, so that may be an option when she has her MRI reviewed. I advised her she needs to take the Diclofenac twice a day with food and not to take Motrin with it. She states she will increase it to twice a day. I think that will really help the facet arthropathy in her back. She is also going to increase her Neurontin to the three times a day as it is ordered. We are getting a UDS today. her  is appropriate with Norco that she got from a dentist in January and February for some tooth issues. I instructed her that she should not get narcotics from any other provider, and that if she does she needs to call us and let us know. She did not take the Percocet and the Norco together. The remainder of the  is appropriate. We will give her one refill of her Percocet. I told her to limit it to twice a day and that once she increases the Diclofenac and the Neurontin, she should be able to only have to take it twice a day or even less. She verbalized her understanding. We will see her back on May 15, 2017, with Dr. Indira Pang for MRI followup. Review of systems:    Past Medical History:   Diagnosis Date    Arthritis     Diabetes ([address]     Hypertension     Morbid obesity ([address]     Osteoarthritis of both knees      Past Surgical History:   Procedure Laterality Date    HX GYN      Menopausal     Social History     Social History    Marital status:      Spouse name: N/A    Number of children: N/A    Years of education: N/A     Occupational History    Not on file.      Social History Main Topics    Smoking status: Former Smoker    Smokeless tobacco: Never Used    Alcohol use No    Drug use: No    Sexual activity: Not Currently     Other Topics Concern    Not on file     Social History Narrative    ** Merged History Encounter ** Family History   Problem Relation Age of Onset    Diabetes Mother     Arthritis-rheumatoid Mother        Physical Exam:  Visit Vitals    /82    Pulse 80    Ht 5' 7\" (1.702 m)    Wt 302 lb (137 kg)    BMI 47.3 kg/m2     Pain Scale: 8/10     has been . reviewed and is appropriate    Pt has a consistent UDS in the record      Bita Johnson was seen today for follow-up. Diagnoses and all orders for this visit:    F[facility]  -     [facility]; Future  -     oxyCODONE-acetaminophen (PERCOCET) 7.5-325 mg per tablet; Take 1 Tab by mouth two (2) times daily as needed for Pain. Max Daily Amount: 2 Tabs. -     diclofenac EC (VOLTAREN) 75 mg EC tablet; Take 1 Tab by mouth two (2) times a day. Chronic bilateral low back pain without sciatica  -     DRUG SCREEN UR - W/ CONFIRM; Future    Therapeutic drug monitoring  -     DRUG SCREEN UR - W/ CONFIRM; Future            Follow-up Disposition:  Return for May 15th with Dr Ynes Albarran for MRI f/u.         We have informed Jessa España to notify us for immediate appointment if she has any worsening neurogical symptoms or if an emergency situation presents, then call [phone]

## 2017-04-28 ENCOUNTER — TELEPHONE (OUTPATIENT)
Dept: ORTHOPEDIC SURGERY | Age: 57
End: 2017-04-28

## 2017-04-28 DIAGNOSIS — M54.50 CHRONIC BILATERAL LOW BACK PAIN WITHOUT SCIATICA: Primary | ICD-10-CM

## 2017-04-28 DIAGNOSIS — G89.29 CHRONIC BILATERAL LOW BACK PAIN WITHOUT SCIATICA: Primary | ICD-10-CM

## 2017-04-28 NOTE — TELEPHONE ENCOUNTER
VORB ENTERED PER NP NEHA MCDERMOTT'S OFFICE NOTE FROM PREVIOUS MRI ENTERED. Faxed new order to Ramesh Cummins at [facility] [phone], faxed confirmation received.

## 2017-04-28 NOTE — TELEPHONE ENCOUNTER
Ms. Rocio Hall is scheduled to complete her MRI tomorrow. Alireza with [facility] states he cannot complete a contrast study unless it is post-operative. Please enter another order for a MRI Lumbar Spine without contrast to be faxed to [facility] at [phone]. PLEASE COMPLETE BY END OF BUSINESS TODAY. Thank you.

## 2017-05-15 ENCOUNTER — OFFICE VISIT (OUTPATIENT)
Dept: ORTHOPEDIC SURGERY | Age: 57
End: 2017-05-15

## 2017-05-15 VITALS
DIASTOLIC BLOOD PRESSURE: 72 MMHG | BODY MASS INDEX: 47.71 KG/M2 | HEART RATE: 78 BPM | RESPIRATION RATE: 17 BRPM | SYSTOLIC BLOOD PRESSURE: 135 MMHG | OXYGEN SATURATION: 97 % | TEMPERATURE: 98.4 F | WEIGHT: 293 LBS

## 2017-05-15 DIAGNOSIS — M79.2 PERIPHERAL NEURALGIA: ICD-10-CM

## 2017-05-15 DIAGNOSIS — D18.00 HEMANGIOMA: Chronic | ICD-10-CM

## 2017-05-15 DIAGNOSIS — M47.816 FACET ARTHROPATHY, LUMBAR: Primary | Chronic | ICD-10-CM

## 2017-05-15 RX ORDER — HYDROCODONE POLISTIREX AND CHLORPHENIRAMINE POLISTIREX 10; 8 MG/5ML; MG/5ML
2.5 SUSPENSION, EXTENDED RELEASE ORAL
COMMUNITY
Start: 2012-01-28 | End: 2017-05-15

## 2017-05-15 RX ORDER — METRONIDAZOLE 500 MG/1
TABLET ORAL
Refills: 0 | COMMUNITY
Start: 2017-02-10 | End: 2017-05-15

## 2017-05-15 RX ORDER — KETOROLAC TROMETHAMINE 15 MG/ML
60 INJECTION, SOLUTION INTRAMUSCULAR; INTRAVENOUS ONCE
Qty: 1 VIAL | Refills: 0
Start: 2017-05-15 | End: 2017-05-15

## 2017-05-15 RX ORDER — GABAPENTIN 300 MG/1
CAPSULE ORAL
Qty: 90 CAP | Refills: 2 | Status: SHIPPED | OUTPATIENT
Start: 2017-05-15 | End: 2017-09-06 | Stop reason: SDUPTHER

## 2017-05-15 RX ORDER — OXYCODONE AND ACETAMINOPHEN 7.5; 325 MG/1; MG/1
TABLET ORAL
Qty: 75 TAB | Refills: 0 | Status: SHIPPED | OUTPATIENT
Start: 2017-05-30

## 2017-05-15 RX ORDER — ACETAMINOPHEN AND CODEINE PHOSPHATE 300; 30 MG/1; MG/1
TABLET ORAL
Refills: 0 | COMMUNITY
Start: 2017-04-27 | End: 2017-05-15

## 2017-05-15 NOTE — PROGRESS NOTES
Verbal order entered per Dr. Roque Reasonbrianne as documented on blue sheet: Refer to [facility], Percocet 7.5mg take 1 tab PO every day to TID prn pain. Disp 75 no refill. Toradol 60mg IM x 1 now.

## 2017-05-15 NOTE — MR AVS SNAPSHOT
Visit Information Date & Time Provider Department Dept. Phone Encounter #  
 5/15/2017  8:40 AM Morenita Del Valle MD [facility] [phone] [id number] Follow-up Instructions Return if symptoms worsen or fail to improve. Upcoming Health Maintenance Date Due Hepatitis C Screening 1960 DTaP/Tdap/Td series (1 - Tdap) 9/12/1979 PAP AKA CERVICAL CYTOLOGY 9/12/1979 BREAST CANCER SCRN MAMMOGRAM 9/12/2008 FOBT Q 1 YEAR AGE 50-75 9/12/2008 INFLUENZA AGE 9 TO ADULT 8/1/2017 Allergies as of 5/15/2017  Review Complete On: 5/15/2017 By: Morenita Del Valle MD  
  
 Severity Noted Reaction Type Reactions Aspirin  01/08/2015    Nausea and Vomiting  
 itching Vicodin [Hydrocodone-acetaminophen]  01/08/2015    Itching Current Immunizations  Never Reviewed No immunizations on file. Not reviewed this visit You Were Diagnosed With   
  
 Codes Comments [facility]    -  Primary ICD-10-CM: M12.88 ICD-9-CM: 721.3 Hemangioma     ICD-10-CM: D18.00 ICD-9-CM: 228.00 Vitals BP Pulse Temp Resp Weight(growth percentile) SpO2  
 135/72 (BP 1 Location: Left arm, BP Patient Position: Sitting) 78 98.4 °F (36.9 °C) (Oral) 17 304 lb 9.6 oz (138.2 kg) 97% BMI OB Status Smoking Status 47.71 kg/m2 Postmenopausal Former Smoker BMI and BSA Data Body Mass Index Body Surface Area  
 47.71 kg/m 2 2.56 m 2 Preferred Pharmacy Pharmacy Name Phone [address] [facility] [phone] Your Updated Medication List  
  
   
This list is accurate as of: 5/15/17  9:42 AM.  Always use your most recent med list.  
  
  
  
  
 ADVAIR DISKUS 250-50 mcg/dose diskus inhaler Generic drug:  fluticasone-salmeterol  
  
 amLODIPine 5 mg tablet Commonly known as:  Barabara Puls 5 mg. Ascensia CONTOUR strip Generic drug:  glucose blood VI test strips USE 1 STRIP 3 TIMES DAILY  
  
 aspirin delayed-release 81 mg tablet 81 mg.  
  
 atorvastatin 20 mg tablet Commonly known as:  LIPITOR Take 20 mg by mouth daily. * BD INSULIN PEN NEEDLE UF ORIG 29 gauge x 1/2\" Ndle Generic drug:  Insulin Needles (Disposable) USE 4 TIMES DAILY * BD INSULIN PEN NEEDLE UF MINI 31 gauge x 3/16\" Ndle Generic drug:  Insulin Needles (Disposable) BD INSULIN SYRINGE ULTRA-FINE 0.5 mL 31 gauge x 5/16 Syrg Generic drug:  Insulin Syringe-Needle U-100  
USE THREE TIMES DAILY AS DIRECTED BYETTA 5 mcg/dose (250 mcg/mL) 1.2 mL injection Generic drug:  exenatide  
  
 cetirizine 10 mg tablet Commonly known as:  ZYRTEC Take 10 mg by mouth nightly. chlorthalidone 25 mg tablet Commonly known as:  Julio César Roots Take 25 mg by mouth daily. [facility] Generic drug:  Blood-Glucose Meter  
use as directed  
  
 diclofenac EC 75 mg EC tablet Commonly known as:  VOLTAREN Take 1 Tab by mouth two (2) times a day. fluticasone 50 mcg/actuation nasal spray Commonly known as:  FLONASE  
instill 1 spray into each nostril twice a day  
  
 gabapentin 300 mg capsule Commonly known as:  NEURONTIN  
take 1 capsule by mouth three times a day  Indications: NEUROPATHIC PAIN  
  
 glipiZIDE SR 2.5 mg CR tablet Commonly known as:  GLUCOTROL XL  
2.5 mg.  
  
 hydroCHLOROthiazide 25 mg tablet Commonly known as:  HYDRODIURIL  
25 mg.  
  
 ketorolac 15 mg/mL Soln injection Commonly known as:  TORADOL  
4 mL by IntraMUSCular route once for 1 dose. LANTUS SOLOSTAR 100 unit/mL (3 mL) pen Generic drug:  insulin glargine  
inject 60 units subcutaneously at bedtime  
  
 metFORMIN 500 mg tablet Commonly known as:  GLUCOPHAGE Take 500 mg by mouth two (2) times daily (with meals). metoprolol succinate 25 mg XL tablet Commonly known as:  TOPROL-XL  
  
 montelukast 10 mg tablet Commonly known as:  SINGULAIR Take 10 mg by mouth daily. MOTRIN  mg tablet Generic drug:  ibuprofen Take  by mouth. MUCINEX 600 mg ER tablet Generic drug:  guaiFENesin ER NovoLOG Flexpen 100 unit/mL Inpn Generic drug:  insulin aspart  
  
 oxyCODONE-acetaminophen 7.5-325 mg per tablet Commonly known as:  PERCOCET Take 1 tab PO every day to TID as needed for pain. DO NOT FILL:5/30/17 Start taking on:  5/30/2017 polyethylene glycol 17 gram/dose powder Commonly known as:  Aktja Esau MIX 17 GM IN  8 OZ OF LIQUID AND DRINL 1 TO 2 TIMES DAILY FOR CONSTIPATION  
  
 pravastatin 20 mg tablet Commonly known as:  PRAVACHOL 40 mg.  
  
 * PROAIR HFA 90 mcg/actuation inhaler Generic drug:  albuterol * albuterol 2.5 mg /3 mL (0.083 %) nebulizer solution Commonly known as:  PROVENTIL VENTOLIN  
  
 ZANTAC 150 mg tablet Generic drug:  raNITIdine Take 150 mg by mouth two (2) times a day. * Notice: This list has 4 medication(s) that are the same as other medications prescribed for you. Read the directions carefully, and ask your doctor or other care provider to review them with you. Prescriptions Printed Refills  
 oxyCODONE-acetaminophen (PERCOCET) 7.5-325 mg per tablet 0 Starting on: 5/30/2017 Sig: Take 1 tab PO every day to TID as needed for pain. DO NOT FILL:5/30/17 Class: Print Prescriptions Sent to Pharmacy Refills  
 gabapentin (NEURONTIN) 300 mg capsule 2 Sig: take 1 capsule by mouth three times a day  Indications: NEUROPATHIC PAIN Class: Normal  
 Pharmacy: [address]  [facility] Ph #: [phone] We Performed the Following KETOROLAC TROMETHAMINE INJ [ [facility]] ID THER/PROPH/DIAG INJECTION, SUBCUT/IM [id number] CPT(R)] REFERRAL TO PAIN MANAGEMENT [[id number] Custom] Comments: [facility] Follow-up Instructions Return if symptoms worsen or fail to improve. Referral Information Referral ID Referred By Referred To  
  
 [id number] Kun Barboza Not Available Visits Status Start Date End Date 1 New Request 5/15/17 5/15/18 If your referral has a status of pending review or denied, additional information will be sent to support the outcome of this decision. Patient Instructions Low Back Arthritis: Exercises Your Care Instructions Here are some examples of typical rehabilitation exercises for your condition. Start each exercise slowly. Ease off the exercise if you start to have pain. Your doctor or physical therapist will tell you when you can start these exercises and which ones will work best for you. When you are not being active, find a comfortable position for rest. Some people are comfortable on the floor or a medium-firm bed with a small pillow under their head and another under their knees. Some people prefer to lie on their side with a pillow between their knees. Don't stay in one position for too long. Take short walks (10 to 20 minutes) every 2 to 3 hours. Avoid slopes, hills, and stairs until you feel better. Walk only distances you can manage without pain, especially leg pain. How to do the exercises Pelvic tilt 1. Lie on your back with your knees bent. 2. \"Brace\" your stomachtighten your muscles by pulling in and imagining your belly button moving toward your spine. 3. Press your lower back into the floor. You should feel your hips and pelvis rock back. 4. Hold for 6 seconds while breathing smoothly. 5. Relax and allow your pelvis and hips to rock forward. 6. Repeat 8 to 12 times. Back stretches 1. Get down on your hands and knees on the floor. 2. Relax your head and allow it to droop. Round your back up toward the ceiling until you feel a nice stretch in your upper, middle, and lower back.  Hold this stretch for as long as it feels comfortable, or about 15 to 30 seconds. 3. Return to the starting position with a flat back while you are on your hands and knees. 4. Let your back sway by pressing your stomach toward the floor. Lift your buttocks toward the ceiling. 5. Hold this position for 15 to 30 seconds. 6. Repeat 2 to 4 times. Follow-up care is a key part of your treatment and safety. Be sure to make and go to all appointments, and call your doctor if you are having problems. It's also a good idea to know your test results and keep a list of the medicines you take. Where can you learn more? Go to http://thuan-javon.info/. Enter P218 in the search box to learn more about \"Low Back Arthritis: Exercises. \" Current as of: May 23, 2016 Content Version: 11.2 © 5587-0936 Healthwise, Incorporated. Care instructions adapted under license by [employer] (which disclaims liability or warranty for this information). If you have questions about a medical condition or this instruction, always ask your healthcare professional. [place] [address] any warranty or liability for your use of this information. Introducing [facility]! Vivek Bucio introduces [employer] patient portal. Now you can access parts of your medical record, email your doctor's office, and request medication refills online. 1. In your internet browser, go to [email or web address]. [employer] 2. Click on the First Time User? Click Here link in the Sign In box. You will see the New Member Sign Up page. 3. Enter your [employer] Access Code exactly as it appears below. You will not need to use this code after youve completed the sign-up process. If you do not sign up before the expiration date, you must request a new code. · [employer] Access Code: [email or web address] Expires: 7/16/2017  1:13 PM 
 
4. Enter the last four digits of your Social Security Number (xxxx) and Date of Birth (mm/dd/yyyy) as indicated and click Submit.  You will be taken to the next sign-up page. 5. Create a Biz360 ID. This will be your Biz360 login ID and cannot be changed, so think of one that is secure and easy to remember. 6. Create a Biz360 password. You can change your password at any time. 7. Enter your Password Reset Question and Answer. This can be used at a later time if you forget your password. 8. Enter your e-mail address. You will receive e-mail notification when new information is available in [address]. 9. Click Sign Up. You can now view and download portions of your medical record. 10. Click the Download Summary menu link to download a portable copy of your medical information. If you have questions, please visit the Frequently Asked Questions section of the Biz360 website. Remember, [employer] is NOT to be used for urgent needs. For medical emergencies, dial 911. Now available from your iPhone and Android! Please provide this summary of care documentation to your next provider. Your primary care clinician is listed as Thompson Cardenas. If you have any questions after today's visit, please call [phone].

## 2017-05-15 NOTE — PATIENT INSTRUCTIONS
Low Back Arthritis: Exercises  Your Care Instructions  Here are some examples of typical rehabilitation exercises for your condition. Start each exercise slowly. Ease off the exercise if you start to have pain. Your doctor or physical therapist will tell you when you can start these exercises and which ones will work best for you. When you are not being active, find a comfortable position for rest. Some people are comfortable on the floor or a medium-firm bed with a small pillow under their head and another under their knees. Some people prefer to lie on their side with a pillow between their knees. Don't stay in one position for too long. Take short walks (10 to 20 minutes) every 2 to 3 hours. Avoid slopes, hills, and stairs until you feel better. Walk only distances you can manage without pain, especially leg pain. How to do the exercises  Pelvic tilt    1. Lie on your back with your knees bent. 2. \"Brace\" your stomach--tighten your muscles by pulling in and imagining your belly button moving toward your spine. 3. Press your lower back into the floor. You should feel your hips and pelvis rock back. 4. Hold for 6 seconds while breathing smoothly. 5. Relax and allow your pelvis and hips to rock forward. 6. Repeat 8 to 12 times. Back stretches    1. Get down on your hands and knees on the floor. 2. Relax your head and allow it to droop. Round your back up toward the ceiling until you feel a nice stretch in your upper, middle, and lower back. Hold this stretch for as long as it feels comfortable, or about 15 to 30 seconds. 3. Return to the starting position with a flat back while you are on your hands and knees. 4. Let your back sway by pressing your stomach toward the floor. Lift your buttocks toward the ceiling. 5. Hold this position for 15 to 30 seconds. 6. Repeat 2 to 4 times. Follow-up care is a key part of your treatment and safety.  Be sure to make and go to all appointments, and call your doctor if you are having problems. It's also a good idea to know your test results and keep a list of the medicines you take. Where can you learn more? Go to http://thuan-javon.info/. Enter J818 in the search box to learn more about \"Low Back Arthritis: Exercises. \"  Current as of: May 23, 2016  Content Version: 11.2  © 2482-2163 [employer]. Care instructions adapted under license by [employer] (which disclaims liability or warranty for this information). If you have questions about a medical condition or this instruction, always ask your healthcare professional. [address] any warranty or liability for your use of this information.

## 2017-05-15 NOTE — PROGRESS NOTES
[address]  [place]  Phone: [phone]  Fax: [phone]        Kole Desai  : 1960  PCP: Ezequiel Gerard MD    PROGRESS NOTE      ASSESSMENT AND PLAN    Jaleesa Feliz was seen today for back pain. Diagnoses and all orders for this visit:    Facet arthropathy, lumbar ([facility]  -     oxyCODONE-acetaminophen (PERCOCET) 7.5-325 mg per tablet; Take 1 tab PO every day to TID as needed for pain. DO NOT FILL:17  -     KETOROLAC TROMETHAMINE INJ  -     THER/PROPH/DIAG INJECTION, SUBCUT/IM  -     REFERRAL TO PAIN MANAGEMENT    Hemangioma w/atypia L5, stable on ,  MRIs  Comments:  No routine imaging necessary  Orders:  -     KETOROLAC TROMETHAMINE INJ  -     THER/PROPH/DIAG INJECTION, SUBCUT/IM  -     REFERRAL TO PAIN MANAGEMENT    Peripheral neuralgia, prob. from DM    Other orders  -     Cancel: [facility]; Future  -     gabapentin (NEURONTIN) 300 mg capsule; take 1 capsule by mouth three times a day  Indications: NEUROPATHIC PAIN  -     ketorolac (TORADOL) 15 mg/mL soln injection; 4 mL by IntraMUSCular route once for 1 dose. 1. Continue Percocet, Gabapentin, and Voltaren. 2. Referral to Pain Management [facility] closer to home. 3. Advised to stay active as tolerated. 4. Wear a carpal tunnel brace QHS. 5. Given home exercises. Discussed need for weight loss and core strengthening. Follow-up Disposition:  Return if symptoms worsen or fail to improve. Risks and benefits of ongoing opiate therapy have been reviewed with the patient.  is appropriate. UDS results have been consistent. No pain behaviors. Pt has a good risk to benefit ratio which allows the pt to function in a home environment without side effects. HISTORY OF PRESENT ILLNESS  Bonnie Colon is a 60 y.o. female. Pt presents to the office for a f/u visit for back pain and atypical L5 vertebrae.  Last visit pt was sent to have a lumbar spine MRI. Images reviewed with the pt. Pt continues to have back pain. She reports that the pain in her back will sometimes feel as if her back is going to break. Her pain mainly stays in her  Back and does not radiate into her BLE. She notes that her pain is severe throughout the day and is most severe in the morning. She has a short standing and walking tolerance. She continues to have RT knee pain as well. She notes that after 10 minutes of standing and walking she will have to sit down. Her pain is severe today as she has not taken any medications this morning. Pt notes that her back will feel as if it will give out on her during the day. She has had paresthesia in her feet that is controlled by Gabapentin. No weakness of BLE. No saddle paresthesia. She takes Percocet 7.5-325 mg BID-TID that she tolerates well. Pt notes that her Percocet does not last very long before her pain increases. Pt denies any dizziness, confusion, uncontrolled constipation, and cravings due to controlled substances. She takes Voltaren 75 mg BID, unsure of benefit with increased dose. She takes Gabapentin 300 mg TID w/benefit. Denies persistent fevers, chills, weight changes, neurogenic bowel or bladder symptoms. Pt denies recent ED visits or hospitalizations. Pt denies any recent GI ulcers, bleeds or renal dysfucntion. She also notes that she has intermittent paresthesia in her hands that is felt all throughout the day that she has been experiencing for the past month. Her paresthesia comes as quickly as it goes. She denies ever being told she has CTS. PMHx of DM and hemangioma w/ atypia at L5. Pt was given Tylenol #3 from dentist. UDS from last visit was consistent with medication use. Pain Assessment  5/15/2017   Location of Pain Back;Hand   Location Modifiers Right;Left   Severity of Pain 8   Quality of Pain Aching; Sharp   Quality of Pain Comment numbness   Duration of Pain -   Frequency of Pain -   Aggravating Factors Walking;Standing   Limiting Behavior -   Relieving Factors Rest   Relieving Factors Comment pain med   Result of Injury -                 MRI Results (most recent):    Results from Orders Only encounter on 04/29/17   MRI LUMB SPINE  WO CONT    Impression:    1.  No significant interval change in mild/moderate multilevel degenerative changes L3-L5  -No central canal stenosis.  -No significant interval change in multilevel foraminal stenosis as discussed.  The L4 foraminal nerve is inferiorly located in the foramen with potential for impingement between the hypertrophic facets and the disc osteophyte, correlate clinically. 2.  Stable atypical osseous hemangioma in L2 vertebral body.  Stable additional osseous hemangiomas at L5. PAST MEDICAL HISTORY   Past Medical History:   Diagnosis Date    Arthritis     Diabetes ([facility] [facility]     Hypertension     Morbid obesity ([facility] [facility]     Osteoarthritis of both knees        Past Surgical History:   Procedure Laterality Date    HX GYN      Menopausal   .      MEDICATIONS      Current Outpatient Prescriptions   Medication Sig Dispense Refill    gabapentin (NEURONTIN) 300 mg capsule take 1 capsule by mouth three times a day  Indications: NEUROPATHIC PAIN 90 Cap 2    [START ON 5/30/2017] oxyCODONE-acetaminophen (PERCOCET) 7.5-325 mg per tablet Take 1 tab PO every day to TID as needed for pain. DO NOT FILL:5/30/17 75 Tab 0    ketorolac (TORADOL) 15 mg/mL soln injection 4 mL by IntraMUSCular route once for 1 dose. 1 Vial 0    diclofenac EC (VOLTAREN) 75 mg EC tablet Take 1 Tab by mouth two (2) times a day. 60 Tab 2    amLODIPine (NORVASC) 5 mg tablet 5 mg.  aspirin delayed-release 81 mg tablet 81 mg.      glipiZIDE SR (GLUCOTROL XL) 2.5 mg CR tablet 2.5 mg.      hydroCHLOROthiazide (HYDRODIURIL) 25 mg tablet 25 mg.       pravastatin (PRAVACHOL) 20 mg tablet 40 mg.      BD INSULIN PEN NEEDLE UF MINI 31 gauge x 3/16\" ndle   0    albuterol (PROVENTIL VENTOLIN) 2.5 mg /3 mL (0.083 %) nebulizer solution   0    LANTUS SOLOSTAR 100 unit/mL (3 mL) pen inject 60 units subcutaneously at bedtime  0    MUCINEX 600 mg ER tablet   0    BYETTA 5 mcg/dose (250 mcg/mL) 1.2 mL injection   0    CONTOUR METER misc use as directed  0    BD INSULIN SYRINGE ULTRA-FINE 0.5 mL 31 gauge x 5/16 syrg USE THREE TIMES DAILY AS DIRECTED  0    BD INSULIN PEN NEEDLE UF ORIG 29 gauge x 1/2\" ndle USE 4 TIMES DAILY  0    ASCENSIA CONTOUR strip USE 1 STRIP 3 TIMES DAILY  0    fluticasone (FLONASE) 50 mcg/actuation nasal spray instill 1 spray into each nostril twice a day  0    polyethylene glycol (MIRALAX) 17 gram/dose powder MIX 17 GM IN  8 OZ OF LIQUID AND DRINL 1 TO 2 TIMES DAILY FOR CONSTIPATION  0    NOVOLOG FLEXPEN 100 unit/mL inpn   0    metoprolol succinate (TOPROL-XL) 25 mg XL tablet   0    ADVAIR DISKUS 250-50 mcg/dose diskus inhaler   0    atorvastatin (LIPITOR) 20 mg tablet Take 20 mg by mouth daily. 0    PROAIR HFA 90 mcg/actuation inhaler   0    cetirizine (ZYRTEC) 10 mg tablet Take 10 mg by mouth nightly.  montelukast (SINGULAIR) 10 mg tablet Take 10 mg by mouth daily.  ranitidine (ZANTAC) 150 mg tablet Take 150 mg by mouth two (2) times a day.  chlorthalidone (HYGROTEN) 25 mg tablet Take 25 mg by mouth daily.  ibuprofen (MOTRIN IB) 200 mg tablet Take  by mouth.  metFORMIN (GLUCOPHAGE) 500 mg tablet Take 500 mg by mouth two (2) times daily (with meals). ALLERGIES    Allergies   Allergen Reactions    Aspirin Nausea and Vomiting     itching    Vicodin [Hydrocodone-Acetaminophen] Itching          SOCIAL HISTORY    Social History     Social History    Marital status:      Spouse name: N/A    Number of children: N/A    Years of education: N/A     Occupational History    Not on file.      Social History Main Topics    Smoking status: Former Smoker    Smokeless tobacco: Never Used    Alcohol use No    Drug use: No    Sexual activity: Not Currently     Other Topics Concern    Not on file     Social History Narrative    ** Merged History Encounter **          Social History Narrative    ** Merged History Encounter **           Problem Relation Age of Onset    Diabetes Mother     Arthritis-rheumatoid Mother        REVIEW OF SYSTEMS  Review of Systems   Constitutional: Negative for chills, fever and weight loss. Respiratory: Negative for shortness of breath. Cardiovascular: Negative for chest pain. Gastrointestinal: Negative for constipation. Negative for fecal incontinence   Genitourinary: Negative for dysuria. Negative for urinary incontinence   Musculoskeletal:        Per HPI   Skin: Negative for rash. Neurological: Positive for tingling. Negative for dizziness, tremors, focal weakness and headaches. Endo/Heme/Allergies: Does not bruise/bleed easily. Psychiatric/Behavioral: The patient does not have insomnia. PHYSICAL EXAMINATION  Visit Vitals    /72 (BP 1 Location: Left arm, BP Patient Position: Sitting)    Pulse 78    Temp 98.4 °F (36.9 °C) (Oral)    Resp 17    Wt 304 lb 9.6 oz (138.2 kg)    SpO2 97%    BMI 47.71 kg/m2         Accompanied by self. Constitutional:  Well developed, well nourished, in no acute distress. Psychiatric: Affect and mood are appropriate. Integumentary: No rashes or abrasions noted on exposed areas. Cardiovascular/Peripheral Vascular: Intact l pulses. No peripheral edema is noted. Lymphatic:  No evidence of lymphedema. No cervical lymphadenopathy. SPINE/MUSCULOSKELETAL EXAM    Negative Tinel's sign at wrist B/L. Lumbar spine:  No rash, ecchymosis, or gross obliquity. No fasciculations. No focal atrophy is noted. Tenderness to palpation L4-5. No tenderness to palpation at the sciatic notch. SI joints non-tender. Trochanters non tender. Sensation grossly intact to light touch.       MOTOR:            Hand Intrin   Right      +4/5 Left      +4/5           Hip Flex  Quads Hamstrings Ankle DF EHL Ankle PF   Right +4/5 +4/5 +4/5 +4/5 +4/5 +4/5   Left +4/5 +4/5 +4/5 +4/5 +4/5 +4/5       Straight Leg raise negative. No hyper reflexia. Ambulation without assistive device. FWB. Written by Roland Byers, as dictated by Kayla Ashley MD.    I, Dr. Kayla Ashley MD, confirm that all documentation is accurate. Ms. Hoda Marcos may have a reminder for a \"due or due soon\" health maintenance. I have asked that she contact her primary care provider for follow-up on this health maintenance.

## 2017-05-24 DIAGNOSIS — M54.50 CHRONIC BILATERAL LOW BACK PAIN WITHOUT SCIATICA: ICD-10-CM

## 2017-05-24 DIAGNOSIS — G89.29 CHRONIC BILATERAL LOW BACK PAIN WITHOUT SCIATICA: ICD-10-CM

## 2017-06-15 DIAGNOSIS — M47.816 FACET ARTHROPATHY, LUMBAR: Chronic | ICD-10-CM

## 2017-06-15 RX ORDER — OXYCODONE AND ACETAMINOPHEN 7.5; 325 MG/1; MG/1
TABLET ORAL
Qty: 75 TAB | Refills: 0 | Status: CANCELLED | OUTPATIENT
Start: 2017-06-29

## 2017-06-15 NOTE — TELEPHONE ENCOUNTER
The patient requests a prescription for Percocet to be filled at the end of this month. She states she will have surgery soon and will be unable to come into the office for  at the time of the next fill. Please advise.      Last Visit: 05/15/2017 with MD Anil Shay    Next Appointment: referred to pain management

## 2017-06-15 NOTE — TELEPHONE ENCOUNTER
Please check on the status of her PM [facility] referral.    What type of surgery is she having? She should get meds from her surgeon.

## 2017-06-16 NOTE — TELEPHONE ENCOUNTER
Called pt. Informed pt per Dr. Sowmya Power message, pt is to get pain medication from physician who will be attending to her surgery or Dr. Ruffin Crew when she sees him on 06/26/17. Pt also informed she was last seen on 05/15/2017 with f/u disposition return if symptoms worsen or fail to improve. Pt verbalized understanding.

## 2017-08-12 DIAGNOSIS — M47.816 FACET ARTHROPATHY, LUMBAR: Chronic | ICD-10-CM

## 2017-08-13 RX ORDER — DICLOFENAC SODIUM 75 MG/1
TABLET, DELAYED RELEASE ORAL
Qty: 60 TAB | Refills: 2 | Status: SHIPPED | OUTPATIENT
Start: 2017-08-13

## 2017-09-06 RX ORDER — GABAPENTIN 300 MG/1
300 CAPSULE ORAL 3 TIMES DAILY
Qty: 90 CAP | Refills: 2 | Status: SHIPPED | OUTPATIENT
Start: 2017-09-06

## 2017-10-06 ENCOUNTER — APPOINTMENT (OUTPATIENT)
Dept: NUTRITION | Age: 57
End: 2017-10-06

## 2018-12-14 ENCOUNTER — OFFICE VISIT (OUTPATIENT)
Dept: ORTHOPEDIC SURGERY | Facility: CLINIC | Age: 58
End: 2018-12-14

## 2018-12-14 VITALS
OXYGEN SATURATION: 98 % | TEMPERATURE: 97.9 F | SYSTOLIC BLOOD PRESSURE: 151 MMHG | RESPIRATION RATE: 18 BRPM | WEIGHT: 293 LBS | HEART RATE: 79 BPM | BODY MASS INDEX: 45.99 KG/M2 | DIASTOLIC BLOOD PRESSURE: 84 MMHG | HEIGHT: 67 IN

## 2018-12-14 DIAGNOSIS — G89.29 CHRONIC PAIN OF BOTH KNEES: ICD-10-CM

## 2018-12-14 DIAGNOSIS — M25.561 CHRONIC PAIN OF BOTH KNEES: ICD-10-CM

## 2018-12-14 DIAGNOSIS — M54.50 LUMBAR PAIN: ICD-10-CM

## 2018-12-14 DIAGNOSIS — M17.0 PRIMARY OSTEOARTHRITIS OF BOTH KNEES: Primary | ICD-10-CM

## 2018-12-14 DIAGNOSIS — M25.562 CHRONIC PAIN OF BOTH KNEES: ICD-10-CM

## 2018-12-14 PROBLEM — E66.01 OBESITY, MORBID (HCC): Status: ACTIVE | Noted: 2018-12-14

## 2018-12-14 NOTE — PROGRESS NOTES
Patient: Shahzad Lezama                MRN: [id number]       SSN: [id number]  YOB: 1960        AGE: 59 y.o. SEX: female      PCP: Jeff Nowak MD  12/14/18    Chief Complaint   Patient presents with    Knee Pain     Jatinder       HISTORY:  Shahzad Lezama is a 59 y.o. female who is seen for increasing bilateral knee pain. The pain in her right knee radiates into her thigh. She states she has not received benefit from Tramadol. She has failed all medications and is unable to take Vicodin due to allergies. She has pain at night when her knees touch while laying down. She remembers stepping an a hole a at work eight years ago. She states the pain is so severe is causes her to sweat at times. She reports occasional swelling in her knees. She feels as though he left knee is going to give out on her. She has pain with walking. She has very sharp pain in her knees. She has grinding and popping sensations. She reports 2 years of significant relief from aquatic physical therapy & Euflexxa series completed in April 2016. She also reports back pain. She reports a h/o scoliosis. She had a fall at work about 7 years ago injuring her back. She has increased pain while walking. Pain Assessment  12/14/2018   Location of Pain Knee   Location Modifiers Left;Right   Severity of Pain 9   Quality of Pain Aching; Throbbing   Quality of Pain Comment -   Duration of Pain Persistent   Frequency of Pain Intermittent   Aggravating Factors Walking;Standing   Limiting Behavior Yes   Relieving Factors Rest   Relieving Factors Comment -   Result of Injury No     Occupation, etc: Ms. Vladimir Ruelas receives [address] [place] [address] 200 for hypertension and diabetes. She quit working for the [employer] crew as a  for [address] seven years ago after her work injury when she slipped in a hole. Her grand daughter drives her around since she does not drive. She lives alone in [place].  She has 2 children with several grandchildren in the area. She has tried losing weight but has gained some back. Current weight is 317 pounds. Current height is 5'7\". Last 3 Recorded Weights in this Encounter    12/14/18 0900   Weight: 317 lb 9.6 oz (144.1 kg)     Body mass index is 49.74 kg/m². 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 from DM M79.2       REVIEW OF SYSTEMS: All Below are Negative except: See HPI     Constitutional: negative for fever, chills, and weight loss. Cardiovascular: negative for chest pain, claudication, leg swelling, SOB, PAGE   Gastrointestinal: Negative for pain, N/V/C/D, Blood in stool or urine, dysuria,  hematuria, incontinence, pelvic pain. Musculoskeletal: See HPI   Neurological: Negative for dizziness and weakness. Negative for headaches, Visual changes, confusion, seizures   Phychiatric/Behavioral: Negative for depression, memory loss, substance  abuse. Extremities: Negative for hair changes, rash, or skin lesion changes. Hematologic: Negative for bleeding problems, bruising, pallor or swollen lymph  nodes   Peripheral Vascular: No calf pain, no circulation deficits.     Social History     Socioeconomic History    Marital status:      Spouse name: Not on file    Number of children: Not on file    Years of education: Not on file    Highest education level: Not on file   Social Needs    Financial resource strain: Not on file    Food insecurity - worry: Not on file    Food insecurity - inability: Not on file    Transportation needs - medical: Not on file    Transportation needs - non-medical: Not on file   Occupational History    Not on file   Tobacco Use    Smoking status: Former Smoker    Smokeless tobacco: Never Used   Substance and Sexual Activity    Alcohol use: No    Drug use: No    Sexual activity: Not Currently   Other Topics Concern    Not on file   Social History Narrative    ** Merged History Encounter **             Allergies   Allergen Reactions    Aspirin Nausea and Vomiting     itching    Vicodin [Hydrocodone-Acetaminophen] Itching        Current Outpatient Medications   Medication Sig    oxyCODONE-acetaminophen (PERCOCET) 7.5-325 mg per tablet Take 1 tab PO every day to TID as needed for pain. DO NOT FILL:5/30/17    amLODIPine (NORVASC) 5 mg tablet 5 mg.  aspirin delayed-release 81 mg tablet 81 mg.    glipiZIDE SR (GLUCOTROL XL) 2.5 mg CR tablet 2.5 mg.    hydroCHLOROthiazide (HYDRODIURIL) 25 mg tablet 25 mg.  pravastatin (PRAVACHOL) 20 mg tablet 40 mg.    BD INSULIN PEN NEEDLE UF MINI 31 gauge x 3/16\" ndle     albuterol (PROVENTIL VENTOLIN) 2.5 mg /3 mL (0.083 %) nebulizer solution     LANTUS SOLOSTAR 100 unit/mL (3 mL) pen inject 60 units subcutaneously at bedtime    BYETTA 5 mcg/dose (250 mcg/mL) 1.2 mL injection     CONTOUR METER misc use as directed    BD INSULIN SYRINGE ULTRA-FINE 0.5 mL 31 gauge x 5/16 syrg USE THREE TIMES DAILY AS DIRECTED    BD INSULIN PEN NEEDLE UF ORIG 29 gauge x 1/2\" ndle USE 4 TIMES DAILY    ASCENSIA CONTOUR strip USE 1 STRIP 3 TIMES DAILY    fluticasone (FLONASE) 50 mcg/actuation nasal spray instill 1 spray into each nostril twice a day    polyethylene glycol (MIRALAX) 17 gram/dose powder MIX 17 GM IN  8 OZ OF LIQUID AND DRINL 1 TO 2 TIMES DAILY FOR CONSTIPATION    NOVOLOG FLEXPEN 100 unit/mL inpn     metoprolol succinate (TOPROL-XL) 25 mg XL tablet     ADVAIR DISKUS 250-50 mcg/dose diskus inhaler     atorvastatin (LIPITOR) 20 mg tablet Take 20 mg by mouth daily.     PROAIR HFA 90 mcg/actuation inhaler     cetirizine (ZYRTEC) 10 mg tablet Take 10 mg by mouth nightly.  montelukast (SINGULAIR) 10 mg tablet Take 10 mg by mouth daily.  ranitidine (ZANTAC) 150 mg tablet Take 150 mg by mouth two (2) times a day.  chlorthalidone (HYGROTEN) 25 mg tablet Take 25 mg by mouth daily.  ibuprofen (MOTRIN IB) 200 mg tablet Take  by mouth.  metFORMIN (GLUCOPHAGE) 500 mg tablet Take 500 mg by mouth two (2) times daily (with meals).  gabapentin (NEURONTIN) 300 mg capsule Take 1 Cap by mouth three (3) times daily. Indications: NEUROPATHIC PAIN    diclofenac EC (VOLTAREN) 75 mg EC tablet take 1 tablet by mouth twice a day    MUCINEX 600 mg ER tablet      No current facility-administered medications for this visit. PHYSICAL EXAMINATION:  Visit Vitals  /84   Pulse 79   Temp 97.9 °F (36.6 °C) (Oral)   Resp 18   Ht 5' 7\" (1.702 m)   Wt 317 lb 9.6 oz (144.1 kg)   SpO2 98%   BMI 49.74 kg/m²        ORTHO EXAMINATION:  Examination Left knee Right knee   Skin Intact Intact   Range of motion 110-0 110-0   Effusion 2+ -   Medial joint line tenderness + +   Lateral joint line tenderness - -   Popliteal tenderness - -   Osteophytes palpable + +   Tommys - -   Patella crepitus + +   Anterior drawer - -   Lateral laxity - -   Medial laxity - -   Varus deformity - -   Valgus deformity - -   Pretibial edema - -   Calf tenderness - -     Examination Lumbar   Skin Intact   Tenderness +   Tightness -   Lordosis Normal   Kyphosis N/A   Scoliosis +   Flexion Fingertips to ankle   Extension 10   Knee reflexes Normal   Ankle reflexes Normal   Straight leg raise -   Calf tenderness -   Stands forward flexed at the waist     RADIOGRAPHS:  XR MALDONADO KNEE 12/14/18 RODNEY  IMPRESSION:  Three views - No fractures, no effusion, severe medial joint space narrowing, + osteophytes present. IKDC Grade D    XR KNEE RIGHT 1/16/15  IMPRESSION:  1.  DJD most pronounced medially and patellofemoral joint with large joint effusion. 2. No acute osseous finding. XR LEFT KNEE 11/21/12  IMPRESSION: No acute osseous abnormality. Tricompartmental degenerative  changes. Joint effusion, suspicious for internal derangement. IMPRESSION:      ICD-10-CM ICD-9-CM    1. Primary osteoarthritis of both knees M17.0 715.16 PROCEDURE AUTHORIZATION TO       REFERRAL TO PHYSICAL THERAPY   2. Chronic pain of both knees M25.561 719.46 AMB POC X-RAY KNEE 3 VIEW    M25.562 338.29 PROCEDURE AUTHORIZATION TO     G89.29  REFERRAL TO PHYSICAL THERAPY   3. Lumbar pain M54.5 724.2 REFERRAL TO PHYSICAL THERAPY     PLAN:  She will be referred for bariatric surgery consultation. Consider visco supplementation if pain continues. She will follow up as needed. We discussed possible need for knee arthroplasty at some time in the future if pain continues pending significant weight loss.      Scribed by Nolan Montanez ([address]) as dictated by Brittany Patrick MD

## 2018-12-20 ENCOUNTER — APPOINTMENT (OUTPATIENT)
Dept: PHYSICAL THERAPY | Age: 58
End: 2018-12-20
Payer: MEDICAID

## 2018-12-21 ENCOUNTER — HOSPITAL ENCOUNTER (OUTPATIENT)
Dept: PHYSICAL THERAPY | Age: 58
Discharge: HOME | End: 2018-12-21
Payer: MEDICAID

## 2018-12-21 PROCEDURE — 97110 THERAPEUTIC EXERCISES: CPT

## 2018-12-21 PROCEDURE — 97140 MANUAL THERAPY 1/> REGIONS: CPT

## 2018-12-21 PROCEDURE — 97162 PT EVAL MOD COMPLEX 30 MIN: CPT

## 2018-12-21 NOTE — PROGRESS NOTES
[address]  [facility] PHYSICAL THERAPY  [address] [facility] [address], [address] - Phone: [phone]  Fax: [phone] / [address] [facility]  Patient Name: Zeferino Bauer : 1960   Medical   Diagnosis: Bilateral knee pain [M25.561, M25.562]  Low back pain [M54.5] Treatment Diagnosis: Bilateral knee OA, LBP   Onset Date: Chronic AGE: 59 y.o. Referral Source: Tabitha Corral MD [facility]): 2018   Prior Hospitalization: See medical history Provider #: [id number]   Prior Level of Function: Amb household distances with [facility]   Comorbidities: Morbid obesity, DM, peripheral neuropathy, HTN   Medications: Verified on Patient Summary List   The Plan of Care and following information is based on the information from the initial evaluation.   =======================================================================================  Assessment / key information:  Patient is a 59 y.o. female who presents with chief complaint of bilateral knee pain and LBP. Both pathologies are mechanical and chronic in nature. Pt demo L/S extension preference and dec pain with intro of lumbar roll in seated position. Pt's knee AROM 0-95 RLE, 0-102 LLE. MMT limited due to patient deferral of required positions. Pt presents with significantly dec functional activity tolerance and mobility due to chronic sedentary lifestyle and deconditioning. Patient would benefit from skilled PT services to address these issues and dec pain and improve mobility.   Thank you for this referral.    ======================================================================================  Eval Complexity: History: HIGH Complexity :3+ comorbidities / personal factors will impact the outcome/ POC Exam:MEDIUM Complexity : 3 Standardized tests and measures addressing body structure, function, activity limitation and / or participation in recreation  Presentation: MEDIUM Complexity : Evolving with changing characteristics  Clinical Decision Making:MEDIUM Complexity : FOTO score of 26-74Overall Complexity:MEDIUM  Problem List: pain affecting function, decrease ROM, decrease strength, edema affecting function, impaired gait/ balance, decrease ADL/ functional abilitiies, decrease activity tolerance, decrease flexibility/ joint mobility and decrease transfer abilities   Treatment Plan may include any combination of the following: Therapeutic exercise, Therapeutic activities, Neuromuscular re-education, Physical agent/modality, Gait/balance training, Manual therapy, Aquatic therapy, Patient education, Self Care training, Functional mobility training, Home safety training and Stair training  Patient / Family readiness to learn indicated by: asking questions, trying to perform skills and interest  Persons(s) to be included in education: patient (P)  Barriers to Learning/Limitations: yes;  physical  Measures taken:    Patient Goal (s): \"get better. \"   Patient self reported health status: fair  Rehabilitation Potential: fair   Short Term Goals: To be accomplished in  2  Weeks:  1. Patient will report 25% improvement in knee pain symptoms since [facility]     Long Term Goals: To be accomplished in  4  Weeks:  1. Patient will be independent with HEP in order to demonstrate ability to perform therapeutic exercises and continue progressing functional mobility upon D/C  2. Patient will report GROC +3 or greater since initial evaluation for improved quality of life  3. Patient will improve FOTO score to 48% to demonstrate a meaningful improvement in functional mobility and increased quality of life      Frequency / Duration:   Patient to be seen  2  times per week for 4-6  weeks:  Patient / Caregiver education and instruction: self care, activity modification and exercises  G-Codes (GP): n/a    Therapist Signature:  Estefani Hillman DPT Date: 77/47/6184   Certification Period: n/a Time: 7:41 AM ===========================================================================================    To ensure your patient receives the highest quality care and to avoid disruption in therapy please sign and return this plan of care within 21 days. Per Medicaid guidelines if the plan of care is not received within 21 days the patient's care must be put on hold until signed. I certify that the above Physical Therapy Services are being furnished while the patient is under my care. I agree with the treatment plan and certify that this therapy is necessary. Physician Signature:        Date:       Time:     Please sign and return to [facility] or you may fax the signed copy to [phone]. Thank you.

## 2018-12-21 NOTE — PROGRESS NOTES
PHYSICAL THERAPY - DAILY TREATMENT NOTE    Patient Name: Khushi Samaniego        Date: 2018  : 1960   YES Patient  Verified  Visit #:   1     Insurance: Payor: [facility] MEDICAID / Plan: [facility] [id number] / Product Type: Managed Care Medicaid /      In time: 10:10 Out time: 10:45   Total Treatment Time: 35 min     TREATMENT AREA =  LBP, Bilateral knee pain    SUBJECTIVE    Pain Level (on 0 to 10 scale):  9  / 10 LBP, bilateral knee pain   Medication Changes/New allergies or changes in medical history, any new surgeries or procedures? NO    If yes, update Summary List   Subjective Functional Status/Changes:  []  No changes reported     Patient reports chronic low back and bilateral knee pain. Pt reports decreased standing tolerance, decreased sleeping tolerance and impaired overall activity level. Patient reports laying down on her back and side to reduce pain. Patient reports she has done aquatic therapy before with good results. Pt states that her pain has gotten worse over the next few years. Pt does not have steps. Pt wishes to move more/better and to be able to get places without as much pain. Patient reports [place] couple of falls\" most recently one month ago. OBJECTIVE    Physical Therapy Evaluation - Lumbar Spine ([facility])    SUBJECTIVE  Chief Complaint: LBP, bilateral knee pain    Mechanism of injury: Insidious    Occupation/Recreation: None. Spends most of day sitting and walking around her house    Functional Status  Prior level of function: Sedentary. Uses SPC for ambulation  Present functional limitations:  What position do you sleep in?:    Symptoms:  Pain rating (0-10):    Today: 9/10   Best: 6/10   Worst: 10/10   [] Contstant:    [x] Intermittent:     Aggravated by:   [] Bending [x] Sitting [x] Standing [] Walking   [] Moving [] Cough [] Sneeze [] Valsalva   [] AM  [] PM  Lying:  [] sup   [] pro   [] sidelying   [] Other:     Eased by: Rest   [] Bending [] Sitting [] Standing [] Walking   [] Moving [] AM  [] PM  Lying: [] sup  [] pro  [] sidelying   [] Other:     General Health:  Red Flags Indicated? [] Yes    [x] No  [] Yes [] No Recent weight change (If yes, due to dieting? [] Yes  [] No)   [] Yes [] No Weakness in legs during walking  [] Yes [] No Unremitting pain at night  [] Yes [] No Abdominal pain or problems  [] Yes [] No Rectal bleeding  [] Yes [] No Feet more cold or painful in cold weather  [] Yes [] No Menstrual irregularities  [] Yes [] No Blood or pain with urination  [] Yes [] No Dysfunction of bowel or bladder  [] Yes [] No Recent illness within past 3 weeks (i.e, cold, flu)  [] Yes [] No Numbness/tingling in buttock/genitalia region    Past History/Treatments:     Diagnostic Tests: [] Lab work [x] X-rays    [] CT [] MRI     [] Other:  Results:  IMPRESSION:  1. DJD most pronounced medially and patellofemoral joint with large joint effusion. 2. No acute osseous finding    IMPRESSION: No acute osseous abnormality. Tricompartmental degenerative  changes. Joint effusion, suspicious for internal derangement.     OBJECTIVE  Posture:  Lateral Shift: [x] R    [x] L     [] +  [x] -  Kyphosis: [] Increased [] Decreased   []  WNL  Lordosis:  [] Increased [] Decreased   [] WNL  Pelvic symmetry: [] WNL    [] Other:    Gait:  [] Normal     [] Abnormal:    Active Movements: [] N/A   [] Too acute   [] Other:  ROM AROM Comments:pain, area   Forward flexion 40-60 Fingertips to knees pain   Extension 20-30 25% decr    SB right 20-30 Def    SB left 20-30 def    Rotation right 5-10 def    Rotation left 5-10 def       Decreased pain with repeated lumbar extension    ROM / Strength       AROM   PROM   Strength    Left Right Left Right Left Right   Knee Flexion 101 94 Pt def Pt def 4 4     Extension 0 0 0 0 4 4       Palpation:   Neg/Pos  Neg/Pos  Neg/Pos   Joint Line pos Quad tendon  Patellar ligament    Patella pos Fibular head  Pes Anserinus pos   Tibial tubercle  Hamstring tendons Infrapatellar fat pad pos     Optional Tests:  Patellar mobility   []L []R Hypermobile [x]L [x]R Hypomobile                   Neuro Screen [] WNL  Myotome/Dermatome/Reflexes: Decr sensation to light touch bilateral plantar surface of feet    Dural Mobility:  SLR Sitting: [] R    [] L    [] +    [x] -  @ (degrees):           Supine: [] R    [] L    [] +    [x] -  @ (degrees):   Slump Test: [] R    [] L    [] +    [x] -  @ (degrees):   Prone Knee Bend: [] R    [] L    [] +    [x] -     10 min Therapeutic Exercise: KT tape to bilateral knees in \"0\" pattern   Rationale:      decrease pain, increase ROM, increase tissue extensibility and increase postural awareness to improve patient's ability to perform ADL's and functional mobility with increased ease and efficiency of movement     10 min Manual Therapy: KT tape to bilateral knees in \"0\" pattern   Rationale:      decrease pain, increase ROM, increase tissue extensibility and increase postural awareness to improve patient's ability to perform ADL's and functional mobility with increased ease and efficiency of movement     throughout min Patient Education:  YES  Reviewed HEP   []  Progressed/Changed HEP based on:         Other Objective/Functional Measures:    See objective data above     Post Treatment Pain Level (on 0 to 10) scale:   7  / 10     ASSESSMENT    Assessment/Changes in Function:     Patient History: High  Examination (low 1-2, mod 3+, high 4+): mod  Clinical Presentation (low stable or uncomplicated, mod evolving or changing, high unstable or unpredictable): mod  Clinical Decision Making (low , mod 26-74, high 1-25): FOTO moderate     [x]  See Progress Note/Recertification   Patient will continue to benefit from skilled PT services to modify and progress therapeutic interventions, address functional mobility deficits, address ROM deficits, address strength deficits, analyze and address soft tissue restrictions, analyze and cue movement patterns, analyze and modify body mechanics/ergonomics, assess and modify postural abnormalities, address imbalance/dizziness and instruct in home and community integration to attain remaining goals.    Progress toward goals / Updated goals:    See plan of care     PLAN    [x]  Upgrade activities as tolerated YES Continue plan of care   []  Discharge due to :    []  Other:

## 2019-01-03 ENCOUNTER — APPOINTMENT (OUTPATIENT)
Dept: PHYSICAL THERAPY | Age: 59
End: 2019-01-03

## 2019-01-08 ENCOUNTER — APPOINTMENT (OUTPATIENT)
Dept: PHYSICAL THERAPY | Age: 59
End: 2019-01-08

## 2019-01-10 ENCOUNTER — APPOINTMENT (OUTPATIENT)
Dept: PHYSICAL THERAPY | Age: 59
End: 2019-01-10

## 2019-01-14 ENCOUNTER — TELEPHONE (OUTPATIENT)
Dept: ORTHOPEDIC SURGERY | Facility: CLINIC | Age: 59
End: 2019-01-14

## 2019-01-14 NOTE — TELEPHONE ENCOUNTER
Patient is calling as she checking on her getting Euflexxa injections.   Patient is anxious to get injections  Please call her back  [phone]

## 2019-01-14 NOTE — TELEPHONE ENCOUNTER
I called the patient to see why she was getting anxious. She stated that someone had told her the authorization had not been placed. I informed her that we had placed it on 12-14-18 and that on 12-28-18 her insurance company had been authorized to buy and bill for the Euflexxa. I told her we were just waiting on confirmation from the insurance company and that someone would call her when she did get approved.

## 2019-01-15 ENCOUNTER — APPOINTMENT (OUTPATIENT)
Dept: PHYSICAL THERAPY | Age: 59
End: 2019-01-15

## 2019-01-17 ENCOUNTER — APPOINTMENT (OUTPATIENT)
Dept: PHYSICAL THERAPY | Age: 59
End: 2019-01-17

## 2019-01-17 NOTE — PROGRESS NOTES
[address] [facility] PHYSICAL THERAPY 
[address] [facility] [address], [place], [address] - Phone: [phone]  Fax: [phone] DISCHARGE SUMMARY FOR PHYSICAL THERAPY Patient Name: Zeferino Bauer : 1960 Treatment/Medical Diagnosis: Bilateral knee pain [M25.561, M25.562] Low back pain [M54.5] Onset Date: Chronic Referral Source: Tabitha Corral MD [facility]): 2018 Prior Hospitalization: See Medical History Provider #: [id number] Medications: Verified on Patient Summary List  
Visits from [facility]: 0 Missed Visits: 0 GOALS · Short Term Goals: To be accomplished in  2  Weeks: 1. Patient will report 25% improvement in knee pain symptoms since [facility] 
  
· Long Term Goals: To be accomplished in  4  Weeks: 1. Patient will be independent with HEP in order to demonstrate ability to perform therapeutic exercises and continue progressing functional mobility upon D/C 
2. Patient will report GROC +3 or greater since initial evaluation for improved quality of life 3. Patient will improve FOTO score to 48% to demonstrate a meaningful improvement in functional mobility and increased quality of life. UNABLE TO REASSESS THE ABOVE GOALS DUE TO NON-COMPLIANCE. SUMMARY OF TREATMENT Patient's POC has consisted of therex, therapeutic activities, manual therapy prn, modalities prn, pt. education, and a comprehensive HEP. Treatment strategies used to address functional mobility deficits, ROM deficits, strength deficits, analyze and address soft tissue restrictions, analyze and cue movement patterns, analyze and modify body mechanics/ergonomics, assess and modify postural abnormalities and instruct in home and community integration.  
  
Key Functional Changes/Progress: Pt will be discharged today due to non-compliance and not returning to physical therapy since 2018.  They were informed on 2019  that they would be discharged due to noncompliance. She stated she canceled her current appointments due to death in the family. She was also contacted on 12/28/2018 regarding an active authorization with another company and that she would not be able to be seen until that authorization was canceled. Assessments/Recommendations: Discontinue therapy due to lack of attendance or compliance. If you have any questions/comments please contact us directly at [phone]. Thank you for allowing us to assist in the care of your patient. Therapist Signature:  Nicki Reyes DPT Date: 1/17/2019 Reporting Period: NA Time: 86:46 PM  
  
Certification Period: NA  
 
 
NOTE TO PHYSICIAN:  PLEASE COMPLETE THE ORDERS BELOW AND FAX TO [facility] Physical Therapy: [phone]. If you are unable to process this request in 24 hours please contact our office: [phone]. 
 
___ I have read the above report and request that my patient be discharged from therapy.   
 
Physician Signature:       Date:      Time:

## 2019-01-22 ENCOUNTER — APPOINTMENT (OUTPATIENT)
Dept: PHYSICAL THERAPY | Age: 59
End: 2019-01-22

## 2019-01-24 ENCOUNTER — APPOINTMENT (OUTPATIENT)
Dept: PHYSICAL THERAPY | Age: 59
End: 2019-01-24

## 2019-01-29 ENCOUNTER — APPOINTMENT (OUTPATIENT)
Dept: PHYSICAL THERAPY | Age: 59
End: 2019-01-29

## 2019-01-31 ENCOUNTER — APPOINTMENT (OUTPATIENT)
Dept: PHYSICAL THERAPY | Age: 59
End: 2019-01-31

## 2019-02-14 ENCOUNTER — OFFICE VISIT (OUTPATIENT)
Dept: ORTHOPEDIC SURGERY | Facility: CLINIC | Age: 59
End: 2019-02-14

## 2019-02-14 VITALS
DIASTOLIC BLOOD PRESSURE: 73 MMHG | OXYGEN SATURATION: 97 % | SYSTOLIC BLOOD PRESSURE: 142 MMHG | RESPIRATION RATE: 18 BRPM | BODY MASS INDEX: 45.99 KG/M2 | TEMPERATURE: 96.4 F | HEIGHT: 67 IN | HEART RATE: 85 BPM | WEIGHT: 293 LBS

## 2019-02-14 DIAGNOSIS — M25.561 CHRONIC PAIN OF BOTH KNEES: ICD-10-CM

## 2019-02-14 DIAGNOSIS — M47.816 FACET ARTHROPATHY, LUMBAR: ICD-10-CM

## 2019-02-14 DIAGNOSIS — M54.50 LUMBAR PAIN: ICD-10-CM

## 2019-02-14 DIAGNOSIS — M54.50 CHRONIC BILATERAL LOW BACK PAIN WITHOUT SCIATICA: ICD-10-CM

## 2019-02-14 DIAGNOSIS — M25.562 CHRONIC PAIN OF BOTH KNEES: ICD-10-CM

## 2019-02-14 DIAGNOSIS — M17.0 PRIMARY OSTEOARTHRITIS OF BOTH KNEES: Primary | ICD-10-CM

## 2019-02-14 DIAGNOSIS — G89.29 CHRONIC PAIN OF BOTH KNEES: ICD-10-CM

## 2019-02-14 DIAGNOSIS — D18.00 HEMANGIOMA: ICD-10-CM

## 2019-02-14 DIAGNOSIS — G89.29 CHRONIC BILATERAL LOW BACK PAIN WITHOUT SCIATICA: ICD-10-CM

## 2019-02-14 RX ORDER — HYALURONATE SODIUM 10 MG/ML
2 SYRINGE (ML) INTRAARTICULAR ONCE
Qty: 2 ML | Refills: 0
Start: 2019-02-14 | End: 2019-02-14

## 2019-02-14 RX ORDER — BETAMETHASONE SODIUM PHOSPHATE AND BETAMETHASONE ACETATE 3; 3 MG/ML; MG/ML
6 INJECTION, SUSPENSION INTRA-ARTICULAR; INTRALESIONAL; INTRAMUSCULAR; SOFT TISSUE ONCE
Qty: 0.5 ML | Refills: 0
Start: 2019-02-14 | End: 2019-02-14

## 2019-02-14 RX ORDER — BUPIVACAINE HYDROCHLORIDE 2.5 MG/ML
4 INJECTION, SOLUTION EPIDURAL; INFILTRATION; INTRACAUDAL ONCE
Qty: 4 ML | Refills: 0
Start: 2019-02-14 | End: 2019-02-14

## 2019-02-14 NOTE — PROGRESS NOTES
Patient: Jasmeet Meyer                MRN: [id number]       SSN: [id number]  YOB: 1960        AGE: 59 y.o. SEX: female      PCP: Riley Dyer MD  02/14/19    CC: MALDONADO KNEE AND LUMBAR     HISTORY:  Jasmeet Meyer is a 59 y.o. female who is seen for increasing bilateral knee and back pain. She reports a h/o scoliosis. She had a fall at work about 7 years ago injuring her back. She has increased pain while walking. The pain in her right knee radiates into her thigh. She was previously seen by Dr. Jai Mason at the spine center. She states she has not received benefit from Tramadol. She has failed all medications and is unable to take Vicodin due to allergies. She reports occasional swelling in her knees. She feels as though he left knee is going to give out on her. She has pain with walking. She has very sharp pain in her knees. She has grinding and popping sensations. She reports 2 years of significant relief from aquatic physical therapy & Euflexxa series completed in April 2016. Pain Assessment  2/14/2019   Location of Pain Knee   Location Modifiers Left;Right   Severity of Pain 0   Quality of Pain -   Quality of Pain Comment -   Duration of Pain -   Frequency of Pain -   Aggravating Factors -   Limiting Behavior No   Relieving Factors -   Relieving Factors Comment -   Result of Injury -     Occupation, etc: Ms. Lori Dye receives [address] [place],[facility] for hypertension and diabetes. She quit working for the [employer] as a  for [facility] [email or web address] seven years ago after her work injury. Her grand daughter drives her around since she does not drive. She lives alone in [place]. She has 2 children with several grandchildren in the area. She has tried losing weight but has gained some back. Her aunt that she was close to passed in 01/2019. Current weight is 317 pounds. Current height is 5'7\".      Last 3 Recorded Weights in this Encounter    02/14/19 0991   Weight: 318 lb 12.8 oz (144.6 kg)     Body mass index is 49.93 kg/m². 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 from DM M79.2    Obesity, morbid ([facility] [address] E66.01       REVIEW OF SYSTEMS: All Below are Negative except: See HPI     Constitutional: negative for fever, chills, and weight loss. Cardiovascular: negative for chest pain, claudication, leg swelling, SOB, PAGE   Gastrointestinal: Negative for pain, N/V/C/D, Blood in stool or urine, dysuria,  hematuria, incontinence, pelvic pain. Musculoskeletal: See HPI   Neurological: Negative for dizziness and weakness. Negative for headaches, Visual changes, confusion, seizures   Phychiatric/Behavioral: Negative for depression, memory loss, substance  abuse. Extremities: Negative for hair changes, rash, or skin lesion changes. Hematologic: Negative for bleeding problems, bruising, pallor or swollen lymph  nodes   Peripheral Vascular: No calf pain, no circulation deficits.     Social History     Socioeconomic History    Marital status:      Spouse name: Not on file    Number of children: Not on file    Years of education: Not on file    Highest education level: Not on file   Social Needs    Financial resource strain: Not on file    Food insecurity - worry: Not on file    Food insecurity - inability: Not on file    Transportation needs - medical: Not on file   [employer] needs - non-medical: Not on file   Occupational History    Not on file   Tobacco Use    Smoking status: Former Smoker    Smokeless tobacco: Never Used Substance and Sexual Activity    Alcohol use: No    Drug use: No    Sexual activity: Not Currently   Other Topics Concern    Not on file   Social History Narrative    ** Merged History Encounter **             Allergies   Allergen Reactions    Aspirin Nausea and Vomiting     itching    Vicodin [Hydrocodone-Acetaminophen] Itching        Current Outpatient Medications   Medication Sig    oxyCODONE-acetaminophen (PERCOCET) 7.5-325 mg per tablet Take 1 tab PO every day to TID as needed for pain. DO NOT FILL:5/30/17    amLODIPine (NORVASC) 5 mg tablet 5 mg.  aspirin delayed-release 81 mg tablet 81 mg.    glipiZIDE SR (GLUCOTROL XL) 2.5 mg CR tablet 2.5 mg.    hydroCHLOROthiazide (HYDRODIURIL) 25 mg tablet 25 mg.  pravastatin (PRAVACHOL) 20 mg tablet 40 mg.    BD INSULIN PEN NEEDLE UF MINI 31 gauge x 3/16\" ndle     albuterol (PROVENTIL VENTOLIN) 2.5 mg /3 mL (0.083 %) nebulizer solution     LANTUS SOLOSTAR 100 unit/mL (3 mL) pen inject 60 units subcutaneously at bedtime    BYETTA 5 mcg/dose (250 mcg/mL) 1.2 mL injection     CONTOUR METER misc use as directed    BD INSULIN SYRINGE ULTRA-FINE 0.5 mL 31 gauge x 5/16 syrg USE THREE TIMES DAILY AS DIRECTED    BD INSULIN PEN NEEDLE UF ORIG 29 gauge x 1/2\" ndle USE 4 TIMES DAILY    ASCENSIA CONTOUR strip USE 1 STRIP 3 TIMES DAILY    fluticasone (FLONASE) 50 mcg/actuation nasal spray instill 1 spray into each nostril twice a day    polyethylene glycol (MIRALAX) 17 gram/dose powder MIX 17 GM IN  8 OZ OF LIQUID AND DRINL 1 TO 2 TIMES DAILY FOR CONSTIPATION    NOVOLOG FLEXPEN 100 unit/mL inpn     metoprolol succinate (TOPROL-XL) 25 mg XL tablet     ADVAIR DISKUS 250-50 mcg/dose diskus inhaler     atorvastatin (LIPITOR) 20 mg tablet Take 20 mg by mouth daily.  PROAIR HFA 90 mcg/actuation inhaler     cetirizine (ZYRTEC) 10 mg tablet Take 10 mg by mouth nightly.  chlorthalidone (HYGROTEN) 25 mg tablet Take 25 mg by mouth daily.     ibuprofen (MOTRIN IB) 200 mg tablet Take  by mouth.  metFORMIN (GLUCOPHAGE) 500 mg tablet Take 500 mg by mouth two (2) times daily (with meals).  gabapentin (NEURONTIN) 300 mg capsule Take 1 Cap by mouth three (3) times daily. Indications: NEUROPATHIC PAIN    diclofenac EC (VOLTAREN) 75 mg EC tablet take 1 tablet by mouth twice a day    MUCINEX 600 mg ER tablet     montelukast (SINGULAIR) 10 mg tablet Take 10 mg by mouth daily.  ranitidine (ZANTAC) 150 mg tablet Take 150 mg by mouth two (2) times a day. No current facility-administered medications for this visit. PHYSICAL EXAMINATION:  Visit Vitals  /73   Pulse 85   Temp 96.4 °F (35.8 °C) (Oral)   Resp 18   Ht 5' 7\" (1.702 m)   Wt 318 lb 12.8 oz (144.6 kg)   SpO2 97%   BMI 49.93 kg/m²        ORTHO EXAMINATION:  Examination Left knee Right knee   Skin Intact Intact   Range of motion 110-0 110-0   Effusion 2+ -   Medial joint line tenderness + +   Lateral joint line tenderness - -   Popliteal tenderness - -   Osteophytes palpable + +   Tommys - -   Patella crepitus + +   Anterior drawer - -   Lateral laxity - -   Medial laxity - -   Varus deformity - -   Valgus deformity - -   Pretibial edema - -   Calf tenderness - -     Examination Lumbar   Skin Intact   Tenderness +   Tightness -   Lordosis Normal   Kyphosis N/A   Scoliosis +   Flexion Fingertips to ankle   Extension 10   Knee reflexes Normal   Ankle reflexes Normal   Straight leg raise -   Calf tenderness -   Stands forward flexed at the waist     RADIOGRAPHS:  XR MALDONADO KNEE 12/14/18 RODNYE  IMPRESSION:  Three views - No fractures, no effusion, severe medial joint space narrowing, + osteophytes present. IKDC Grade D    XR KNEE RIGHT 1/16/15  IMPRESSION:  1. DJD most pronounced medially and patellofemoral joint with large joint effusion. 2. No acute osseous finding. XR LEFT KNEE 11/21/12  IMPRESSION: No acute osseous abnormality. Tricompartmental degenerative  changes.  Joint effusion, suspicious for internal derangement. IMPRESSION:      ICD-10-CM ICD-9-CM    1. Primary osteoarthritis of both knees M17.0 715.16 SD DRAIN/INJECT LARGE JOINT/BURSA      EUFLEXXA INJECTION PER DOSE      sodium hyaluronate (SUPARTZ FX/HYALGAN/GENIVSC) 10 mg/mL syrg injection      REFERRAL TO PHYSICAL THERAPY   2. Chronic pain of both knees M25.561 719.46 SD DRAIN/INJECT LARGE JOINT/BURSA    M25.562 338.29 EUFLEXXA INJECTION PER DOSE    G89.29  sodium hyaluronate (SUPARTZ FX/HYALGAN/GENIVSC) 10 mg/mL syrg injection      REFERRAL TO PHYSICAL THERAPY   3. Lumbar pain M54.5 724.2 betamethasone (CELESTONE SOLUSPAN) 6 mg/mL injection      BETAMETHASONE ACETATE & SODIUM PHOSPHATE INJECTION 3 MG EA. THER/PROPH/DIAG INJECTION, SUBCUT/IM      bupivacaine, PF, (MARCAINE, PF,) 0.25 % (2.5 mg/mL) injection      REFERRAL TO SPINE SURGERY      REFERRAL TO PHYSICAL THERAPY   4. Facet arthropathy, lumbar M47.816 721.3 betamethasone (CELESTONE SOLUSPAN) 6 mg/mL injection      BETAMETHASONE ACETATE & SODIUM PHOSPHATE INJECTION 3 MG EA. THER/PROPH/DIAG INJECTION, SUBCUT/IM      bupivacaine, PF, (MARCAINE, PF,) 0.25 % (2.5 mg/mL) injection      REFERRAL TO SPINE SURGERY      REFERRAL TO PHYSICAL THERAPY   5. 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 THER/PROPH/DIAG INJECTION, SUBCUT/IM      bupivacaine, PF, (MARCAINE, PF,) 0.25 % (2.5 mg/mL) injection      REFERRAL TO SPINE SURGERY      REFERRAL TO PHYSICAL THERAPY   6. Chronic bilateral low back pain without sciatica M54.5 724.2 betamethasone (CELESTONE SOLUSPAN) 6 mg/mL injection    G89.29 338.29 BETAMETHASONE ACETATE & SODIUM PHOSPHATE INJECTION 3 MG EA.       THER/PROPH/DIAG INJECTION, SUBCUT/IM      bupivacaine, PF, (MARCAINE, PF,) 0.25 % (2.5 mg/mL) injection      REFERRAL TO SPINE SURGERY      REFERRAL TO PHYSICAL THERAPY PLAN:  After discussing treatment options, patient's paralumbar regions were injected with 4 cc Marcaine and 1/2 cc Celestone. She will be referred for bariatric surgery consultation. She will follow up in one week for Euflexxa #2. We discussed possible need for knee arthroplasty at some time in the future if pain continues pending significant weight loss. There is no need for surgery at this time. She will follow up with Dr. Adrienne Krishnamurthy at the spine center. She will start a brief course of outpatient aquatic physical therapy.     Scribed by Alfa Chaves (Chester Jett) as dictated by Renay Reynoso MD

## 2019-02-14 NOTE — PATIENT INSTRUCTIONS
Hyaluronic Acid (By injection)   Hyaluronic Acid (vyd-by-gsc-ON-ate AS-id)  Treats severe pain in your knee due to osteoarthritis. Brand Name(s): Euflexxa, Gel-One, GelSyn-3, GenVisc 850, Hyalgan, Hymovis, Monovisc, Orthovisc, Supartz FX, Visco-3   There may be other brand names for this medicine. When This Medicine Should Not Be Used: This medicine is not right for everyone. You should not receive it if you had an allergic reaction to hyaluronic acid or if you have a bleeding disorder. How to Use This Medicine:   Injectable  · Your doctor will tell you how many injections you will need. This medicine is injected into your knee joint. · A nurse or other health provider will give you this medicine. Drugs and Foods to Avoid:      Ask your doctor or pharmacist before using any other medicine, including over-the-counter medicines, vitamins, and herbal products. Warnings While Using This Medicine:   · Tell your doctor if you are pregnant or breastfeeding, or if you have any allergies, including to birds, feathers, or eggs. · Rest your knee for 48 hours after you receive an injection. Do not do strenuous, weightbearing activities, such as jogging or tennis. Avoid activities that keep you standing for longer than 1 hour. Possible Side Effects While Using This Medicine:   Call your doctor right away if you notice any of these side effects:  · Allergic reaction: Itching or hives, swelling in your face or hands, swelling or tingling in your mouth or throat, chest tightness, trouble breathing  If you notice these less serious side effects, talk with your doctor:   · Mild increase in pain or swelling in your knee  · Pain, redness, or swelling where the medicine is injected  If you notice other side effects that you think are caused by this medicine, tell your doctor. Call your doctor for medical advice about side effects.  You may report side effects to FDA at 2-011-FDA-4330  © 2017 [address] Information is for End User's use only and may not be sold, redistributed or otherwise used for commercial purposes. The above information is an  only. It is not intended as medical advice for individual conditions or treatments. Talk to your doctor, nurse or pharmacist before following any medical regimen to see if it is safe and effective for you. Knee Arthritis: Exercises  Your Care Instructions  Here are some examples of exercises for knee arthritis. Start each exercise slowly. Ease off the exercise if you start to have pain. Your doctor or physical therapist will tell you when you can start these exercises and which ones will work best for you. How to do the exercises  Knee flexion with heel slide    1. Lie on your back with your knees bent. 2. Slide your heel back by bending your affected knee as far as you can. Then hook your other foot around your ankle to help pull your heel even farther back. 3. Hold for about 6 seconds, then rest for up to 10 seconds. 4. Repeat 8 to 12 times. 5. Switch legs and repeat steps 1 through 4, even if only one knee is sore. Quad sets    1. Sit with your affected leg straight and supported on the floor or a firm bed. Place a small, rolled-up towel under your knee. Your other leg should be bent, with that foot flat on the floor. 2. Tighten the thigh muscles of your affected leg by pressing the back of your knee down into the towel. 3. Hold for about 6 seconds, then rest for up to 10 seconds. 4. Repeat 8 to 12 times. 5. Switch legs and repeat steps 1 through 4, even if only one knee is sore. Straight-leg raises to the front    1. Lie on your back with your good knee bent so that your foot rests flat on the floor. Your affected leg should be straight. Make sure that your low back has a normal curve.  You should be able to slip your hand in between the floor and the small of your back, with your palm touching the floor and your back touching the back of your hand. 2. Tighten the thigh muscles in your affected leg by pressing the back of your knee flat down to the floor. Hold your knee straight. 3. Keeping the thigh muscles tight and your leg straight, lift your affected leg up so that your heel is about 12 inches off the floor. Hold for about 6 seconds, then lower slowly. 4. Relax for up to 10 seconds between repetitions. 5. Repeat 8 to 12 times. 6. Switch legs and repeat steps 1 through 5, even if only one knee is sore. Active knee flexion    1. Lie on your stomach with your knees straight. If your kneecap is uncomfortable, roll up a washcloth and put it under your leg just above your kneecap. 2. Lift the foot of your affected leg by bending the knee so that you bring the foot up toward your buttock. If this motion hurts, try it without bending your knee quite as far. This may help you avoid any painful motion. 3. Slowly move your leg up and down. 4. Repeat 8 to 12 times. 5. Switch legs and repeat steps 1 through 4, even if only one knee is sore. Quadriceps stretch (facedown)    1. Lie flat on your stomach, and rest your face on the floor. 2. Wrap a towel or belt strap around the lower part of your affected leg. Then use the towel or belt strap to slowly pull your heel toward your buttock until you feel a stretch. 3. Hold for about 15 to 30 seconds, then relax your leg against the towel or belt strap. 4. Repeat 2 to 4 times. 5. Switch legs and repeat steps 1 through 4, even if only one knee is sore. Stationary exercise bike    1. If you do not have a stationary exercise bike at home, you can find one to ride at your local health club or community center. 2. Adjust the height of the bike seat so that your knee is slightly bent when your leg is extended downward. If your knee hurts when the pedal reaches the top, you can raise the seat so that your knee does not bend as much. 3. Start slowly.  At first, try to do 5 to 10 minutes of cycling with little to no resistance. Then increase your time and the resistance bit by bit until you can do 20 to 30 minutes without pain. 4. If you start to have pain, rest your knee until your pain gets back to the level that is normal for you. Or cycle for less time or with less effort. Follow-up care is a key part of your treatment and safety. Be sure to make and go to all appointments, and call your doctor if you are having problems. It's also a good idea to know your test results and keep a list of the medicines you take. Where can you learn more? Go to http://thuan-javon.info/. Enter C159 in the search box to learn more about \"Knee Arthritis: Exercises. \"  Current as of: September 20, 2018  Content Version: 11.9  © 5142-7694 [employer]. Care instructions adapted under license by [employer] (which disclaims liability or warranty for this information). If you have questions about a medical condition or this instruction, always ask your healthcare professional. [address] any warranty or liability for your use of this information.

## 2019-02-21 ENCOUNTER — OFFICE VISIT (OUTPATIENT)
Dept: ORTHOPEDIC SURGERY | Facility: CLINIC | Age: 59
End: 2019-02-21

## 2019-02-21 VITALS
HEART RATE: 82 BPM | HEIGHT: 67 IN | SYSTOLIC BLOOD PRESSURE: 137 MMHG | WEIGHT: 293 LBS | OXYGEN SATURATION: 98 % | BODY MASS INDEX: 45.99 KG/M2 | RESPIRATION RATE: 18 BRPM | TEMPERATURE: 97.6 F | DIASTOLIC BLOOD PRESSURE: 75 MMHG

## 2019-02-21 DIAGNOSIS — M25.561 CHRONIC PAIN OF BOTH KNEES: ICD-10-CM

## 2019-02-21 DIAGNOSIS — M17.0 PRIMARY OSTEOARTHRITIS OF BOTH KNEES: Primary | ICD-10-CM

## 2019-02-21 DIAGNOSIS — G89.29 CHRONIC PAIN OF BOTH KNEES: ICD-10-CM

## 2019-02-21 DIAGNOSIS — M25.562 CHRONIC PAIN OF BOTH KNEES: ICD-10-CM

## 2019-02-21 RX ORDER — HYALURONATE SODIUM 10 MG/ML
2 SYRINGE (ML) INTRAARTICULAR ONCE
Qty: 2 ML | Refills: 0
Start: 2019-02-21 | End: 2019-02-21

## 2019-02-21 NOTE — PROGRESS NOTES
Patient: Meek Gonzalez                MRN: [id number]       SSN: [id number] YOB: 1960        AGE: 59 y.o. SEX: female Body mass index is 49.9 kg/m². PCP: Susie Barnett MD 
02/21/19 Chief Complaint Patient presents with  Knee Pain Jatinder HISTORY:  Meek Gonzalez is a 59 y.o. female who is seen for knee pain. ICD-10-CM ICD-9-CM 1. Primary osteoarthritis of both knees M17.0 715.16 AL DRAIN/INJECT LARGE JOINT/BURSA EUFLEXXA INJECTION PER DOSE  
   sodium hyaluronate (SUPARTZ FX/HYALGAN/GENIVSC) 10 mg/mL syrg injection 2. Chronic pain of both knees M25.561 719.46 AL DRAIN/INJECT LARGE JOINT/BURSA  
 M25.562 338.29 EUFLEXXA INJECTION PER DOSE  
 G89.29  sodium hyaluronate (SUPARTZ FX/HYALGAN/GENIVSC) 10 mg/mL syrg injection PROCEDURE:  After discussing treatment options, Ms. Juaquin Dominique knees were injected with 2 cc of Euflexxa. Chart reviewed for the following: 
 Sanju Monae MD, have reviewed the History, Physical and updated the Allergic reactions for Meek Gonzalez TIME OUT performed immediately prior to start of procedure: 
Sanju Monae MD, have performed the following reviews on Meek Gonzalez prior to the start of the procedure: 
         
* Patient was identified by name and date of birth * Agreement on procedure being performed was verified * Risks and Benefits explained to the patient * Procedure site verified and marked as necessary * Patient was positioned for comfort * Consent was obtained Time: 9:58 AM  
 
Date of procedure: 2/21/2019 Procedure performed by:  Srinath Fan MD 
 
Ms. Vita Wong tolerated the procedure well with no complications. PLAN:  After discussing treatment options, patient's knees were injected with 2 cc of Euflexxa. Ms. Vita Wong will follow up in one week to complete her Euflexxa injection series.    
 
Scribed by Edelmira Lucero [address]) as dictated by Srinath Fan MD

## 2019-02-27 ENCOUNTER — APPOINTMENT (OUTPATIENT)
Dept: PHYSICAL THERAPY | Age: 59
End: 2019-02-27

## 2019-02-28 ENCOUNTER — OFFICE VISIT (OUTPATIENT)
Dept: ORTHOPEDIC SURGERY | Facility: CLINIC | Age: 59
End: 2019-02-28

## 2019-02-28 VITALS
WEIGHT: 293 LBS | BODY MASS INDEX: 45.99 KG/M2 | RESPIRATION RATE: 16 BRPM | OXYGEN SATURATION: 99 % | DIASTOLIC BLOOD PRESSURE: 65 MMHG | SYSTOLIC BLOOD PRESSURE: 139 MMHG | TEMPERATURE: 96.4 F | HEART RATE: 77 BPM | HEIGHT: 67 IN

## 2019-02-28 DIAGNOSIS — G89.29 CHRONIC PAIN OF BOTH KNEES: ICD-10-CM

## 2019-02-28 DIAGNOSIS — M25.561 CHRONIC PAIN OF BOTH KNEES: ICD-10-CM

## 2019-02-28 DIAGNOSIS — M17.0 PRIMARY OSTEOARTHRITIS OF BOTH KNEES: Primary | ICD-10-CM

## 2019-02-28 DIAGNOSIS — M25.562 CHRONIC PAIN OF BOTH KNEES: ICD-10-CM

## 2019-02-28 RX ORDER — HYALURONATE SODIUM 10 MG/ML
2 SYRINGE (ML) INTRAARTICULAR ONCE
Qty: 2 ML | Refills: 0
Start: 2019-02-28 | End: 2019-02-28

## 2019-02-28 NOTE — PROGRESS NOTES
Patient: Dionisio Murray                MRN: [id number]       SSN: [id number] YOB: 1960        AGE: 59 y.o. SEX: female Body mass index is 49.81 kg/m². PCP: Tripp Knight MD 
02/28/19 Chief Complaint Patient presents with  Injection Euflexxa # 3 - B/L knee HISTORY:  Dionisio Murray is a 59 y.o. female who is seen for knee pain. PROCEDURE:  After discussing treatment options, Ms. Sonali Young  knees were injected with 2 cc of Euflexxa. TIME OUT performed immediately prior to start of procedure: 
Juan Diego Walker MD, have performed the following reviews on Dionisio Murray prior to the start of the procedure: 
         
* Patient was identified by name and date of birth * Agreement on procedure being performed was verified * Risks and Benefits explained to the patient * Procedure site verified and marked as necessary * Patient was positioned for comfort * Consent was obtained Time: 10:44 AM  
 
Date of procedure: 2/28/2019 Procedure performed by:  Diana Chester MD 
 
Ms. Jay Gu tolerated the procedure well with no complications ICD-10-CM ICD-9-CM 1. Primary osteoarthritis of both knees M17.0 715.16 MI DRAIN/INJECT LARGE JOINT/BURSA EUFLEXXA INJECTION PER DOSE  
   sodium hyaluronate (SUPARTZ FX/HYALGAN/GENIVSC) 10 mg/mL syrg injection 2. Chronic pain of both knees M25.561 719.46 MI DRAIN/INJECT LARGE JOINT/BURSA  
 M25.562 338.29 EUFLEXXA INJECTION PER DOSE  
 G89.29  sodium hyaluronate (SUPARTZ FX/HYALGAN/GENIVSC) 10 mg/mL syrg injection PLAN:  After discussing treatment options, patient's percy knee were injected with 2 cc of Euflexxa. Ms. Jay Gu will follow up PRN now that she has completed her Euflexxa injection series.    
 
Scribed by Lelia Bryant [facility]) as dictated by Diana Chester

## 2019-03-08 ENCOUNTER — APPOINTMENT (OUTPATIENT)
Dept: PHYSICAL THERAPY | Age: 59
End: 2019-03-08

## 2019-03-29 ENCOUNTER — APPOINTMENT (OUTPATIENT)
Dept: PHYSICAL THERAPY | Age: 59
End: 2019-03-29

## 2019-09-26 NOTE — TELEPHONE ENCOUNTER
Gisella please schedule patient for MRI at [facility]. age(85 years old or older)

## 2019-12-19 ENCOUNTER — OFFICE VISIT (OUTPATIENT)
Dept: ORTHOPEDIC SURGERY | Facility: CLINIC | Age: 59
End: 2019-12-19

## 2019-12-19 VITALS
DIASTOLIC BLOOD PRESSURE: 71 MMHG | OXYGEN SATURATION: 98 % | SYSTOLIC BLOOD PRESSURE: 141 MMHG | TEMPERATURE: 97.4 F | WEIGHT: 293 LBS | HEART RATE: 83 BPM | HEIGHT: 67 IN | BODY MASS INDEX: 45.99 KG/M2 | RESPIRATION RATE: 18 BRPM

## 2019-12-19 DIAGNOSIS — M17.12 PRIMARY OSTEOARTHRITIS OF LEFT KNEE: Primary | ICD-10-CM

## 2019-12-19 DIAGNOSIS — M17.11 PRIMARY OSTEOARTHRITIS OF RIGHT KNEE: ICD-10-CM

## 2019-12-19 DIAGNOSIS — M25.562 CHRONIC PAIN OF LEFT KNEE: ICD-10-CM

## 2019-12-19 DIAGNOSIS — M25.561 CHRONIC PAIN OF RIGHT KNEE: ICD-10-CM

## 2019-12-19 DIAGNOSIS — G89.29 CHRONIC PAIN OF RIGHT KNEE: ICD-10-CM

## 2019-12-19 DIAGNOSIS — G89.29 CHRONIC PAIN OF LEFT KNEE: ICD-10-CM

## 2019-12-19 NOTE — PROGRESS NOTES
Patient: Zoran Sears                MRN: [id number]       SSN: [id number]  YOB: 1960        AGE: 62 y.o. SEX: female      PCP: Nelly Ashraf MD  12/19/19    Chief Complaint   Patient presents with    Knee Pain     Jatinder     HISTORY:  Zoran Sears is a 62 y.o. female who is seen for bilateral knee pain. She reports occasional swelling in her knees. She feels as though her left knee is going to give out on her. She has pain with walking. She has very sharp pain in her knees. She has grinding and popping sensations. She reports relief from aquatic physical therapy & Euflexxa series completed in April 2016 and 2/28/19. Previous cortisone injections did not help as much as she would have liked. She was previously seen for back pain. She reports a h/o scoliosis. She had a fall at work about 7 years ago injuring her back. She has increased pain while walking. The pain in her right knee radiates into her thigh. She was previously seen by Dr. Adelina [facility]cker [facility]. She states she has not received benefit from Tramadol. She has failed all medications and is unable to take Vicodin due to allergies. Pain Assessment  12/19/2019   Location of Pain Knee   Location Modifiers Left;Right   Severity of Pain 7   Quality of Pain Aching   Quality of Pain Comment -   Duration of Pain Persistent   Frequency of Pain Intermittent   Aggravating Factors Walking;Standing;Bending   Limiting Behavior Yes   Relieving Factors Rest   Relieving Factors Comment -   Result of Injury No     Occupation, etc: Ms. Nia Quan receives [address] [facility] [address] for hypertension and diabetes. She quit working for the [employer] crew as a  for [facility] [employer] seven years ago after her work injury. Her grand daughter drives her around since she does not drive. She lives alone in [place]. She has 2 children with several grandchildren in the area. Her aunt that she was close to passed in 01/2019.  She lost 10 pounds by cutting out fried foods and sodas. Current weight is 307 pounds. Current height is 5'7\". Last 3 Recorded Weights in this Encounter    12/19/19 0825   Weight: 307 lb 9.6 oz (139.5 kg)     Body mass index is 48.18 kg/m². 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 from DM M79.2    Obesity, morbid ([facility] [address] E66.01       REVIEW OF SYSTEMS: All Below are Negative except: See HPI     Constitutional: negative for fever, chills, and weight loss. Cardiovascular: negative for chest pain, claudication, leg swelling, SOB, PAGE   Gastrointestinal: Negative for pain, N/V/C/D, Blood in stool or urine, dysuria,  hematuria, incontinence, pelvic pain. Musculoskeletal: See HPI   Neurological: Negative for dizziness and weakness. Negative for headaches, Visual changes, confusion, seizures   Phychiatric/Behavioral: Negative for depression, memory loss, substance  abuse. Extremities: Negative for hair changes, rash, or skin lesion changes. Hematologic: Negative for bleeding problems, bruising, pallor or swollen lymph  nodes   Peripheral Vascular: No calf pain, no circulation deficits.     Social History     Socioeconomic History    Marital status:      Spouse name: Not on file    Number of children: Not on file    Years of education: Not on file    Highest education level: Not on file   Occupational History    Not on file   Social Needs    Financial resource strain: Not on file    Food insecurity:     Worry: Not on file     Inability: Not on file   [employer] needs: Medical: Not on file     Non-medical: Not on file   Tobacco Use    Smoking status: Former Smoker    Smokeless tobacco: Never Used   Substance and Sexual Activity    Alcohol use: No    Drug use: No    Sexual activity: Not Currently   Lifestyle    Physical activity:     Days per week: Not on file     Minutes per session: Not on file    Stress: Not on file   Relationships    Social connections:     Talks on phone: Not on file     Gets together: Not on file     Attends Amish service: Not on file     Active member of club or organization: Not on file     Attends meetings of clubs or organizations: Not on file     Relationship status: Not on file    Intimate partner violence:     Fear of current or ex partner: Not on file     Emotionally abused: Not on file     Physically abused: Not on file     Forced sexual activity: Not on file   Other Topics Concern    Not on file   Social History Narrative    ** Merged History Encounter **             Allergies   Allergen Reactions    Aspirin Nausea and Vomiting     itching    Vicodin [Hydrocodone-Acetaminophen] Itching        Current Outpatient Medications   Medication Sig    oxyCODONE-acetaminophen (PERCOCET) 7.5-325 mg per tablet Take 1 tab PO every day to TID as needed for pain. DO NOT FILL:5/30/17    amLODIPine (NORVASC) 5 mg tablet 5 mg.  aspirin delayed-release 81 mg tablet 81 mg.    glipiZIDE SR (GLUCOTROL XL) 2.5 mg CR tablet 2.5 mg.    hydroCHLOROthiazide (HYDRODIURIL) 25 mg tablet 25 mg.     pravastatin (PRAVACHOL) 20 mg tablet 40 mg.    BD INSULIN PEN NEEDLE UF MINI 31 gauge x 3/16\" ndle     albuterol (PROVENTIL VENTOLIN) 2.5 mg /3 mL (0.083 %) nebulizer solution     LANTUS SOLOSTAR 100 unit/mL (3 mL) pen inject 60 units subcutaneously at bedtime    MUCINEX 600 mg ER tablet     BYETTA 5 mcg/dose (250 mcg/mL) 1.2 mL injection     CONTOUR METER misc use as directed    BD INSULIN SYRINGE ULTRA-FINE 0.5 mL 31 gauge x 5/16 syrg USE THREE TIMES DAILY AS DIRECTED    BD INSULIN PEN NEEDLE UF ORIG 29 gauge x 1/2\" ndle USE 4 TIMES DAILY    ASCENSIA CONTOUR strip USE 1 STRIP 3 TIMES DAILY    polyethylene glycol (MIRALAX) 17 gram/dose powder MIX 17 GM IN  8 OZ OF LIQUID AND DRINL 1 TO 2 TIMES DAILY FOR CONSTIPATION    NOVOLOG FLEXPEN 100 unit/mL inpn     metoprolol succinate (TOPROL-XL) 25 mg XL tablet     ADVAIR DISKUS 250-50 mcg/dose diskus inhaler     atorvastatin (LIPITOR) 20 mg tablet Take 20 mg by mouth daily.  PROAIR HFA 90 mcg/actuation inhaler     cetirizine (ZYRTEC) 10 mg tablet Take 10 mg by mouth nightly.  montelukast (SINGULAIR) 10 mg tablet Take 10 mg by mouth daily.  chlorthalidone (HYGROTEN) 25 mg tablet Take 25 mg by mouth daily.  ibuprofen (MOTRIN IB) 200 mg tablet Take  by mouth.  metFORMIN (GLUCOPHAGE) 500 mg tablet Take 500 mg by mouth two (2) times daily (with meals).  gabapentin (NEURONTIN) 300 mg capsule Take 1 Cap by mouth three (3) times daily. Indications: NEUROPATHIC PAIN    diclofenac EC (VOLTAREN) 75 mg EC tablet take 1 tablet by mouth twice a day    fluticasone (FLONASE) 50 mcg/actuation nasal spray instill 1 spray into each nostril twice a day    ranitidine (ZANTAC) 150 mg tablet Take 150 mg by mouth two (2) times a day. No current facility-administered medications for this visit.          PHYSICAL EXAMINATION:  Visit Vitals  /71   Pulse 83   Temp 97.4 °F (36.3 °C) (Oral)   Resp 18   Ht 5' 7\" (1.702 m)   Wt 307 lb 9.6 oz (139.5 kg)   SpO2 98%   BMI 48.18 kg/m²        ORTHO EXAMINATION:  Examination Left knee Right knee   Skin Intact Intact   Range of motion 95-5 95-5   Effusion - -   Medial joint line tenderness + +   Lateral joint line tenderness - -   Popliteal tenderness - -   Osteophytes palpable + +   Tommys - -   Patella crepitus + +   Anterior drawer - -   Lateral laxity - -   Medial laxity - -   Varus deformity - -   Valgus deformity - -   Pretibial edema - -   Calf tenderness - -     Examination Lumbar   Skin Intact   Tenderness +   Tightness -   Lordosis Normal   Kyphosis N/A   Scoliosis +   Flexion Fingertips to mid shin   Extension 10   Knee reflexes Normal   Ankle reflexes Normal   Straight leg raise -   Calf tenderness -   Stands forward flexed at the waist     RADIOGRAPHS:  XR MALDONADO KNEE 12/14/18 RODNEY  IMPRESSION:  Three views - No fractures, no effusion, severe medial joint space narrowing, + osteophytes present. IKDC Grade D    XR KNEE RIGHT 1/16/15  IMPRESSION:  1. DJD most pronounced medially and patellofemoral joint with large joint effusion. 2. No acute osseous finding. XR LEFT KNEE 11/21/12  IMPRESSION: No acute osseous abnormality. Tricompartmental degenerative  changes. Joint effusion, suspicious for internal derangement. IMPRESSION:      ICD-10-CM ICD-9-CM    1. Primary osteoarthritis of left knee M17.12 715.16 PROCEDURE AUTHORIZATION TO       REFERRAL TO PHYSICAL THERAPY   2. Chronic pain of left knee M25.562 719.46 PROCEDURE AUTHORIZATION TO     G89.29 338.29 REFERRAL TO PHYSICAL THERAPY   3. Primary osteoarthritis of right knee M17.11 715.16 PROCEDURE AUTHORIZATION TO       REFERRAL TO PHYSICAL THERAPY   4. Chronic pain of right knee M25.561 719.46 PROCEDURE AUTHORIZATION TO     G89.29 338.29 REFERRAL TO PHYSICAL THERAPY     PLAN:  She will start a brief course of outpatient physical therapy. Consider visco supplementation if pain continues. We discussed possible need for knee arthroplasty at some time in the future if pain continues further weight loss. There is no need for surgery at this time. She will follow up as needed.      Scribed by Elier Sevilla ([address]) as dictated by Flor Soto MD

## 2020-01-24 ENCOUNTER — OFFICE VISIT (OUTPATIENT)
Dept: ORTHOPEDIC SURGERY | Facility: CLINIC | Age: 60
End: 2020-01-24

## 2020-01-24 VITALS
DIASTOLIC BLOOD PRESSURE: 63 MMHG | SYSTOLIC BLOOD PRESSURE: 113 MMHG | WEIGHT: 293 LBS | BODY MASS INDEX: 45.99 KG/M2 | HEIGHT: 67 IN | RESPIRATION RATE: 15 BRPM | TEMPERATURE: 96 F | OXYGEN SATURATION: 96 % | HEART RATE: 69 BPM

## 2020-01-24 DIAGNOSIS — M17.12 PRIMARY OSTEOARTHRITIS OF LEFT KNEE: ICD-10-CM

## 2020-01-24 DIAGNOSIS — M54.50 LUMBAR PAIN: ICD-10-CM

## 2020-01-24 DIAGNOSIS — M25.561 CHRONIC PAIN OF RIGHT KNEE: ICD-10-CM

## 2020-01-24 DIAGNOSIS — G89.29 CHRONIC PAIN OF LEFT KNEE: ICD-10-CM

## 2020-01-24 DIAGNOSIS — M17.11 PRIMARY OSTEOARTHRITIS OF RIGHT KNEE: ICD-10-CM

## 2020-01-24 DIAGNOSIS — G89.29 CHRONIC PAIN OF RIGHT KNEE: ICD-10-CM

## 2020-01-24 DIAGNOSIS — M25.562 CHRONIC PAIN OF LEFT KNEE: ICD-10-CM

## 2020-01-24 DIAGNOSIS — M47.816 FACET ARTHROPATHY, LUMBAR: Primary | ICD-10-CM

## 2020-01-24 RX ORDER — HYALURONATE SODIUM 10 MG/ML
2 SYRINGE (ML) INTRAARTICULAR ONCE
Qty: 2 ML | Refills: 0
Start: 2020-01-24 | End: 2020-01-24

## 2020-01-24 NOTE — PROGRESS NOTES
Problem: Physiological Stability  Goal: Vital signs and physical assessments stable and within expected parameters  Outcome: Outcome Met, Continue evaluating goal progress toward completion  Goal: Remains free of signs and symptoms of infection  Outcome: Outcome Met, Continue evaluating goal progress toward completion     Problem: Thermoregulation  Goal: Body temperature maintained within normal range  Outcome: Outcome Met, Continue evaluating goal progress toward completion     Problem: Pain  Goal: Acceptable level of comfort exhibited by infant (based on N-Pass/NIPS Scoring)  Outcome: Outcome Met, Continue evaluating goal progress toward completion     Problem: Skin Integrity  Goal: Skin integrity is maintained or improved (skin will be intact without erythma or breakdown)  Outcome: Outcome Met, Continue evaluating goal progress toward completion     Problem: Nutrition  Goal: Consumes sufficient dietary intake without complications  Outcome: Outcome Not Met, Continue to Monitor  Goal: Progresses toward ability to receive all feeding from breast or bottle  Outcome: Outcome Not Met, Continue to Monitor  Goal: Achieves catch up weight gain and growth consistent with birth weight percentile  Outcome: Outcome Not Met, Continue to Monitor  Goal: Parent/ caregiver verbalizes understanding of milk preparation,  storage and bottle feeding techniques  Description: Document on Patient Education Activity  Outcome: Outcome Not Met, Continue to Monitor     Problem: Physiological Stability  Goal: Parent / caregiver verbalizes understanding of NICU care related to patient-specific condition and treatment  Description: Document on Patient Education Activity  Outcome: Outcome Not Met, Continue to Monitor  Goal: Parent / caregiver verbalizes / demonstrates comfort with their ability to care for infant and familiarity with community resources prior to discharge  Description: Document on Patient Education Activity  Outcome: Outcome  1. Have you been to the ER, urgent care clinic since your last visit? Hospitalized since your last visit? No    2. Have you seen or consulted any other health care providers outside of the [address] [place] [address] since your last visit? Include any pap smears or colon screening.  No Not Met, Continue to Monitor     Problem: Alteration in Family Bonding  Goal: Family Interaction is supported  Outcome: Outcome Not Met, Continue to Monitor  Goal: Family demonstrates appropriate coping mechanisms  Outcome: Outcome Not Met, Continue to Monitor     Problem: Risk of altered growth and development secondary to gestational age or condition  Goal: Demonstrates improved/ appropriate age-corrected neurobehavioral competence at time of discharge or will be referred for ongoing therapy  Outcome: Outcome Not Met, Continue to Monitor  Goal: Parent / caregiver verbalizes understanding of developmentally appropriate interaction & environment  Description: Document on Patient Education Activity  Outcome: Outcome Not Met, Continue to Monitor  Goal: Parent / caregiver verbalizes understanding of risk for RSV and prevention  Description: Document on Patient Education Activity  Outcome: Outcome Not Met, Continue to Monitor  Continue to maintain plan of care as ordered.

## 2020-01-24 NOTE — PROGRESS NOTES
Patient: Cesilia Cortes                MRN: [id number]       SSN: [id number]  YOB: 1960        AGE: 62 y.o. SEX: female      PCP: Kasi Foy MD  01/24/20    CC: BACK AND BILATERAL KNEE PAIN    HISTORY:  Cesilia Cortes is a 62 y.o. female who is seen for back and bilateral knee pain. She reports a h/o scoliosis. She had a fall at work about 7 years ago injuring her back. She has increased pain while walking. The pain in her right knee radiates into her thigh. She was previously seen by Dr. Molly Hurtado at the spine center. She states she has not received benefit from Tramadol. She has failed all medications and is unable to take Vicodin due to allergies. She is also seen for bilateral knee pain. She reports occasional swelling in her knees. She feels as though her left knee is going to give out on her. She has pain with walking. She has very sharp pain in her knees. She has grinding and popping sensations. She reports relief from aquatic physical therapy & Euflexxa series completed in April 2016 and 2/28/19. Previous cortisone injections did not help as much as she would have liked. Pain Assessment  1/24/2020   Location of Pain Knee   Location Modifiers Left;Right   Severity of Pain 8   Quality of Pain Popping;Dull   Quality of Pain Comment -   Duration of Pain A few hours   Frequency of Pain Intermittent   Aggravating Factors Walking   Aggravating Factors Comment weather   Limiting Behavior -   Relieving Factors Rest   Relieving Factors Comment -   Result of Injury No     Occupation, etc: Ms. Katelyn Massey receives [address] [place] [address] 200 for hypertension and diabetes. She quit working for the [employer] crew as a  for [facility] [employer] seven years ago after her work injury. Her grand daughter drives her around since she does not drive. She lives alone in [place]. She has 2 children with several grandchildren in the area. Her aunt that she was close to passed in 01/2019.  She lost 10 pounds by cutting out fried foods and sodas. Current weight is 307 pounds. Current height is 5'7\". Last 3 Recorded Weights in this Encounter    01/24/20 0806   Weight: 304 lb (137.9 kg)     Body mass index is 47.61 kg/m². 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 from DM M79.2    Obesity, morbid ([facility] [address] E66.01       REVIEW OF SYSTEMS: All Below are Negative except: See HPI     Constitutional: negative for fever, chills, and weight loss. Cardiovascular: negative for chest pain, claudication, leg swelling, SOB, PAGE   Gastrointestinal: Negative for pain, N/V/C/D, Blood in stool or urine, dysuria,  hematuria, incontinence, pelvic pain. Musculoskeletal: See HPI   Neurological: Negative for dizziness and weakness. Negative for headaches, Visual changes, confusion, seizures   Phychiatric/Behavioral: Negative for depression, memory loss, substance  abuse. Extremities: Negative for hair changes, rash, or skin lesion changes. Hematologic: Negative for bleeding problems, bruising, pallor or swollen lymph  nodes   Peripheral Vascular: No calf pain, no circulation deficits.     Social History     Socioeconomic History    Marital status:      Spouse name: Not on file    Number of children: Not on file    Years of education: Not on file    Highest education level: Not on file   Occupational History    Not on file   Social Needs    Financial resource strain: Not on file    Food insecurity:     Worry: Not on file     Inability: Not on file   Tastemade needs: Medical: Not on file     Non-medical: Not on file   Tobacco Use    Smoking status: Former Smoker    Smokeless tobacco: Never Used   Substance and Sexual Activity    Alcohol use: No    Drug use: No    Sexual activity: Not Currently   Lifestyle    Physical activity:     Days per week: Not on file     Minutes per session: Not on file    Stress: Not on file   Relationships    Social connections:     Talks on phone: Not on file     Gets together: Not on file     Attends Gnosticist service: Not on file     Active member of club or organization: Not on file     Attends meetings of clubs or organizations: Not on file     Relationship status: Not on file    Intimate partner violence:     Fear of current or ex partner: Not on file     Emotionally abused: Not on file     Physically abused: Not on file     Forced sexual activity: Not on file   Other Topics Concern    Not on file   Social History Narrative    ** Merged History Encounter **             Allergies   Allergen Reactions    Aspirin Nausea and Vomiting     itching    Vicodin [Hydrocodone-Acetaminophen] Itching        Current Outpatient Medications   Medication Sig    gabapentin (NEURONTIN) 300 mg capsule Take 1 Cap by mouth three (3) times daily. Indications: NEUROPATHIC PAIN    diclofenac EC (VOLTAREN) 75 mg EC tablet take 1 tablet by mouth twice a day    oxyCODONE-acetaminophen (PERCOCET) 7.5-325 mg per tablet Take 1 tab PO every day to TID as needed for pain. DO NOT FILL:5/30/17    amLODIPine (NORVASC) 5 mg tablet 5 mg.  aspirin delayed-release 81 mg tablet 81 mg.    glipiZIDE SR (GLUCOTROL XL) 2.5 mg CR tablet 2.5 mg.    hydroCHLOROthiazide (HYDRODIURIL) 25 mg tablet 25 mg.     pravastatin (PRAVACHOL) 20 mg tablet 40 mg.    BD INSULIN PEN NEEDLE UF MINI 31 gauge x 3/16\" ndle     albuterol (PROVENTIL VENTOLIN) 2.5 mg /3 mL (0.083 %) nebulizer solution     LANTUS SOLOSTAR 100 unit/mL (3 mL) pen inject 60 units subcutaneously at bedtime    MUCINEX 600 mg ER tablet     BYETTA 5 mcg/dose (250 mcg/mL) 1.2 mL injection     CONTOUR METER misc use as directed    BD INSULIN SYRINGE ULTRA-FINE 0.5 mL 31 gauge x 5/16 syrg USE THREE TIMES DAILY AS DIRECTED    BD INSULIN PEN NEEDLE UF ORIG 29 gauge x 1/2\" ndle USE 4 TIMES DAILY    ASCENSIA CONTOUR strip USE 1 STRIP 3 TIMES DAILY    fluticasone (FLONASE) 50 mcg/actuation nasal spray instill 1 spray into each nostril twice a day    polyethylene glycol (MIRALAX) 17 gram/dose powder MIX 17 GM IN  8 OZ OF LIQUID AND DRINL 1 TO 2 TIMES DAILY FOR CONSTIPATION    NOVOLOG FLEXPEN 100 unit/mL inpn     metoprolol succinate (TOPROL-XL) 25 mg XL tablet     ADVAIR DISKUS 250-50 mcg/dose diskus inhaler     atorvastatin (LIPITOR) 20 mg tablet Take 20 mg by mouth daily.  PROAIR HFA 90 mcg/actuation inhaler     cetirizine (ZYRTEC) 10 mg tablet Take 10 mg by mouth nightly.  montelukast (SINGULAIR) 10 mg tablet Take 10 mg by mouth daily.  ranitidine (ZANTAC) 150 mg tablet Take 150 mg by mouth two (2) times a day.  chlorthalidone (HYGROTEN) 25 mg tablet Take 25 mg by mouth daily.  ibuprofen (MOTRIN IB) 200 mg tablet Take  by mouth.  metFORMIN (GLUCOPHAGE) 500 mg tablet Take 500 mg by mouth two (2) times daily (with meals). No current facility-administered medications for this visit.          PHYSICAL EXAMINATION:  Visit Vitals  /63 (BP 1 Location: Left arm, BP Patient Position: Sitting)   Pulse 69   Temp 96 °F (35.6 °C) (Oral)   Resp 15   Ht 5' 7\" (1.702 m)   Wt 304 lb (137.9 kg)   SpO2 96%   BMI 47.61 kg/m²        ORTHO EXAMINATION:  Examination Left knee Right knee   Skin Intact Intact   Range of motion 95-5 95-5   Effusion - -   Medial joint line tenderness + +   Lateral joint line tenderness - -   Popliteal tenderness - -   Osteophytes palpable + +   Tommys - -   Patella crepitus + +   Anterior drawer - -   Lateral laxity - -   Medial laxity - -   Varus deformity - - Valgus deformity - -   Pretibial edema - -   Calf tenderness - -     Examination Lumbar   Skin Intact   Tenderness +   Tightness -   Lordosis Normal   Kyphosis N/A   Scoliosis +   Flexion Fingertips to upper shin   Extension 10   Knee reflexes Normal   Ankle reflexes Normal   Straight leg raise -   Calf tenderness -   Stands forward flexed at the waist     TIME OUT:  Chart reviewed for the following:   I, Rene Sauer MD, have reviewed the History, Physical and updated the Allergic reactions for [address] performed immediately prior to start of procedure:  Octavio Rahman MD, have performed the following reviews on Ronaldo Burris prior to the start of the procedure:          * Patient was identified by name and date of birth   * Agreement on procedure being performed was verified  * Risks and Benefits explained to the patient  * Procedure site verified and marked as necessary  * Patient was positioned for comfort  * Consent was obtained     Time: 8:39 AM     Date of procedure: 1/24/2020  Procedure performed by:  Rene Sauer MD  Ms. Gray Puckett tolerated the procedure well with no complications. RADIOGRAPHS:  XR MALDONADO KNEE 12/14/18 RODNEY  IMPRESSION:  Three views - No fractures, no effusion, severe medial joint space narrowing, + osteophytes present. IKDC Grade D    XR KNEE RIGHT 1/16/15  IMPRESSION:  1. DJD most pronounced medially and patellofemoral joint with large joint effusion. 2. No acute osseous finding. XR LEFT KNEE 11/21/12  IMPRESSION: No acute osseous abnormality. Tricompartmental degenerative  changes. Joint effusion, suspicious for internal derangement. IMPRESSION:      ICD-10-CM ICD-9-CM    1.  Facet arthropathy, lumbar M47.816 721.3 REFERRAL TO PHYSICAL THERAPY   2. Primary osteoarthritis of left knee M17.12 715.16 ND DRAIN/INJECT LARGE JOINT/BURSA      EUFLEXXA INJECTION PER DOSE      sodium hyaluronate (SUPARTZ FX/HYALGAN/GENIVSC) 10 mg/mL syrg injection      REFERRAL TO PHYSICAL THERAPY   3. Chronic pain of left knee M25.562 719.46 ND DRAIN/INJECT LARGE JOINT/BURSA    G89.29 338.29 EUFLEXXA INJECTION PER DOSE      sodium hyaluronate (SUPARTZ FX/HYALGAN/GENIVSC) 10 mg/mL syrg injection      REFERRAL TO PHYSICAL THERAPY   4. Primary osteoarthritis of right knee M17.11 715.16 ND DRAIN/INJECT LARGE JOINT/BURSA      EUFLEXXA INJECTION PER DOSE      sodium hyaluronate (SUPARTZ FX/HYALGAN/GENIVSC) 10 mg/mL syrg injection      REFERRAL TO PHYSICAL THERAPY   5. Chronic pain of right knee M25.561 719.46 ND DRAIN/INJECT LARGE JOINT/BURSA    G89.29 338.29 EUFLEXXA INJECTION PER DOSE      sodium hyaluronate (SUPARTZ FX/HYALGAN/GENIVSC) 10 mg/mL syrg injection      REFERRAL TO PHYSICAL THERAPY   6. Lumbar pain M54.5 724.2 REFERRAL TO PHYSICAL THERAPY     PLAN:  After discussing treatment options, patient's knees were injected with 2 cc Euflexxa. She will start a brief course of outpatient aquatic physical therapy. We discussed possible need for knee arthroplasty at some time in the future if pain continues further weight loss. There is no need for surgery at this time. She will follow up in 1 week for Euflexxa #2. She will continue to follow up at the spine center.       Scribed by Sariah Mora (Mario Fix) as dictated by Woody Wilson MD

## 2020-01-31 ENCOUNTER — OFFICE VISIT (OUTPATIENT)
Dept: ORTHOPEDIC SURGERY | Facility: CLINIC | Age: 60
End: 2020-01-31

## 2020-01-31 VITALS
RESPIRATION RATE: 18 BRPM | OXYGEN SATURATION: 95 % | SYSTOLIC BLOOD PRESSURE: 130 MMHG | HEART RATE: 77 BPM | DIASTOLIC BLOOD PRESSURE: 77 MMHG | TEMPERATURE: 97.7 F | WEIGHT: 293 LBS | BODY MASS INDEX: 45.99 KG/M2 | HEIGHT: 67 IN

## 2020-01-31 DIAGNOSIS — G89.29 CHRONIC PAIN OF RIGHT KNEE: ICD-10-CM

## 2020-01-31 DIAGNOSIS — G89.29 CHRONIC PAIN OF LEFT KNEE: ICD-10-CM

## 2020-01-31 DIAGNOSIS — M17.12 PRIMARY OSTEOARTHRITIS OF LEFT KNEE: Primary | ICD-10-CM

## 2020-01-31 DIAGNOSIS — M17.11 PRIMARY OSTEOARTHRITIS OF RIGHT KNEE: ICD-10-CM

## 2020-01-31 DIAGNOSIS — M25.561 CHRONIC PAIN OF RIGHT KNEE: ICD-10-CM

## 2020-01-31 DIAGNOSIS — M25.562 CHRONIC PAIN OF LEFT KNEE: ICD-10-CM

## 2020-01-31 RX ORDER — HYALURONATE SODIUM 10 MG/ML
2 SYRINGE (ML) INTRAARTICULAR ONCE
Qty: 2 ML | Refills: 0
Start: 2020-01-31 | End: 2020-01-31

## 2020-01-31 RX ORDER — HYALURONATE SODIUM 10 MG/ML
2 SYRINGE (ML) INTRAARTICULAR ONCE
Qty: 2 ML | Refills: 0
Start: 2020-01-31 | End: 2020-01-31 | Stop reason: SDUPTHER

## 2020-01-31 NOTE — PROGRESS NOTES
Patient: Antonieta Galdamez                MRN: [id number]       SSN: [id number]  YOB: 1960        AGE: 62 y.o. SEX: female  Body mass index is 47.3 kg/m². PCP: José Miguel Gibbs MD  01/31/20    Chief Complaint   Patient presents with    Knee Pain     MALDONADO KNEE PAIN     HISTORY:  Antonieta Galdamez is a 62 y.o. female who is seen for bilateral knee pain. ICD-10-CM ICD-9-CM    1. Primary osteoarthritis of left knee M17.12 715.16 LA DRAIN/INJECT LARGE JOINT/BURSA      EUFLEXXA INJECTION PER DOSE      sodium hyaluronate (SUPARTZ FX/HYALGAN/GENIVSC) 10 mg/mL syrg injection   2. Chronic pain of left knee M25.562 719.46 LA DRAIN/INJECT LARGE JOINT/BURSA    G89.29 338.29 EUFLEXXA INJECTION PER DOSE      sodium hyaluronate (SUPARTZ FX/HYALGAN/GENIVSC) 10 mg/mL syrg injection   3. Primary osteoarthritis of right knee M17.11 715.16 LA DRAIN/INJECT LARGE JOINT/BURSA      EUFLEXXA INJECTION PER DOSE      sodium hyaluronate (SUPARTZ FX/HYALGAN/GENIVSC) 10 mg/mL syrg injection   4.  Chronic pain of right knee M25.561 719.46 LA DRAIN/INJECT LARGE JOINT/BURSA    G89.29 338.29 EUFLEXXA INJECTION PER DOSE      sodium hyaluronate (SUPARTZ FX/HYALGAN/GENIVSC) 10 mg/mL syrg injection       Chart reviewed for the following:   Villa Mcardle, MD, have reviewed the History, Physical and updated the Allergic reactions for [place] [address] performed immediately prior to start of procedure:  Villa Mcardle, MD, have performed the following reviews on Antonieta Galdamez prior to the start of the procedure:            * Patient was identified by name and date of birth   * Agreement on procedure being performed was verified  * Risks and Benefits explained to the patient  * Procedure site verified and marked as necessary  * Patient was positioned for comfort  * Consent was obtained     Time: 8:30 AM     Date of procedure: 1/31/2020    Procedure performed by:  Noble Rilye MD    Ms. Leesa Red tolerated the procedure well with no complications. PLAN:  After discussing treatment options, patient's knees were injected with 2 cc of Euflexxa. Ms. Ari Quezada will follow up in one week to finish her visco supplementation injection series.       Scribed by Chayito Macario (Lisa Little) as dictated by Logan Angel MD

## 2020-02-07 ENCOUNTER — OFFICE VISIT (OUTPATIENT)
Dept: ORTHOPEDIC SURGERY | Facility: CLINIC | Age: 60
End: 2020-02-07

## 2020-02-07 VITALS
DIASTOLIC BLOOD PRESSURE: 85 MMHG | SYSTOLIC BLOOD PRESSURE: 142 MMHG | BODY MASS INDEX: 45.99 KG/M2 | TEMPERATURE: 96.6 F | OXYGEN SATURATION: 97 % | HEIGHT: 67 IN | HEART RATE: 83 BPM | RESPIRATION RATE: 18 BRPM | WEIGHT: 293 LBS

## 2020-02-07 DIAGNOSIS — M17.11 PRIMARY OSTEOARTHRITIS OF RIGHT KNEE: ICD-10-CM

## 2020-02-07 DIAGNOSIS — M25.561 CHRONIC PAIN OF RIGHT KNEE: ICD-10-CM

## 2020-02-07 DIAGNOSIS — M25.562 CHRONIC PAIN OF LEFT KNEE: ICD-10-CM

## 2020-02-07 DIAGNOSIS — G89.29 CHRONIC PAIN OF RIGHT KNEE: ICD-10-CM

## 2020-02-07 DIAGNOSIS — M17.12 PRIMARY OSTEOARTHRITIS OF LEFT KNEE: Primary | ICD-10-CM

## 2020-02-07 DIAGNOSIS — G89.29 CHRONIC PAIN OF LEFT KNEE: ICD-10-CM

## 2020-02-07 RX ORDER — HYALURONATE SODIUM 10 MG/ML
2 SYRINGE (ML) INTRAARTICULAR ONCE
Qty: 2 ML | Refills: 0
Start: 2020-02-07 | End: 2020-02-07

## 2020-02-07 NOTE — PROGRESS NOTES
Patient: Addy Kaufman                MRN: [id number]       SSN: [id number]  YOB: 1960        AGE: 62 y.o. SEX: female  Body mass index is 47.8 kg/m². PCP: Gustavo Toribio MD  02/07/20    Chief Complaint   Patient presents with    Knee Pain     Jatinder     HISTORY:  Addy Kaufman is a 62 y.o. female who is seen for bilateral knee pain. TIME OUT performed immediately prior to start of procedure:  Nannette Correa MD, have performed the following reviews on Addy Kaufman prior to the start of the procedure:            * Patient was identified by name and date of birth   * Agreement on procedure being performed was verified  * Risks and Benefits explained to the patient  * Procedure site verified and marked as necessary  * Patient was positioned for comfort  * Consent was obtained     Time: 8:34 AM     Date of procedure: 2/7/2020    Procedure performed by:  Yue Chance MD    Ms. Jonny Zhang tolerated the procedure well with no complications      [id number] ICD-9-CM    1. Primary osteoarthritis of left knee M17.12 715.16 AK DRAIN/INJECT LARGE JOINT/BURSA      EUFLEXXA INJECTION PER DOSE      sodium hyaluronate (SUPARTZ FX/HYALGAN/GENIVSC) 10 mg/mL syrg injection   2. Chronic pain of left knee M25.562 719.46 AK DRAIN/INJECT LARGE JOINT/BURSA    G89.29 338.29 EUFLEXXA INJECTION PER DOSE      sodium hyaluronate (SUPARTZ FX/HYALGAN/GENIVSC) 10 mg/mL syrg injection   3. Primary osteoarthritis of right knee M17.11 715.16 AK DRAIN/INJECT LARGE JOINT/BURSA      EUFLEXXA INJECTION PER DOSE      sodium hyaluronate (SUPARTZ FX/HYALGAN/GENIVSC) 10 mg/mL syrg injection   4. Chronic pain of right knee M25.561 719.46 AK DRAIN/INJECT LARGE JOINT/BURSA    G89.29 338.29 EUFLEXXA INJECTION PER DOSE      sodium hyaluronate (SUPARTZ FX/HYALGAN/GENIVSC) 10 mg/mL syrg injection     PLAN:  After discussing treatment options, patient's knees were injected with 2 cc of Euflexxa.   Ms. Jonny Zhang will follow up PRN now that she has completed her visco supplementation injection series.       Scribed by Yuriy Blanton ([address] [place] [address] [address]) as dictated by Rosemarie Nguyen MD

## 2020-02-14 ENCOUNTER — HOSPITAL ENCOUNTER (OUTPATIENT)
Dept: PHYSICAL THERAPY | Age: 60
Discharge: HOME | End: 2020-02-14
Payer: MEDICAID

## 2020-02-14 PROCEDURE — 97162 PT EVAL MOD COMPLEX 30 MIN: CPT

## 2020-02-14 NOTE — PROGRESS NOTES
PHYSICAL THERAPY - DAILY TREATMENT NOTE    Patient Name: Jennifer Cuenca        Date: 2020  : 1960   YES Patient  Verified  Visit #:      of   10  Insurance: Payor: [facility] MEDICAID / Plan: Jorge Rehan Hernadez Aarti / Product Type: Managed Care Medicaid /    In time: 422 Out time: 652   Total Treatment Time: 35     Medicare/BCBS Time Tracking (below)   Total Timed Codes (min):  NA 1:1 Treatment Time:  NA     TREATMENT AREA =  Low back pain [M54.5]  Left knee pain [M25.562]  Right knee pain [M25.561]    SUBJECTIVE    Pain Level (on 0 to 10 scale):  7  / 10   Medication Changes/New allergies or changes in medical history, any new surgeries or procedures? NO    If yes, update Summary List   Subjective Functional Status/Changes:  []  No changes reported   CC:  HPI:    Pt arrived to session stating that she did not want to start aquatic therapy secondary to temperature and also reported that she did not know if she could begin physical therapy secondary to potential death in the family. Pt attempted to start physical therapy early in  but self-discharged secondary death in the family. At this time she did not complete any follow-up sessions. Chronic Lower Back and Bilateral Knee Pain without DALE. Symptoms: Low Back: Dull Bilateral Knee Pain: sharp, dull achy  Pain rating (0-10): Today: 7/10                         Best: 5/10                        Worst: 10/10    Pain with functional activities:  [x] Yes     [] No       Sit<>stand  [x] Yes     [] No       Squatting  [x] Yes     [] No       Stairs (steps)   [x] Yes     [] No       Walking(10 min)        Past History/Treatments:  Yes     PMH: HTN, Asthma, OA, Diabetes  Patient Goals:  \"Getting my knees stronger, I dont know if yah can help my back\"     OBJECTIVE     Physical Therapy Evaluation - Lumbar Spine        OBJECTIVE  Gait: No AD, Antalgic, Waddling Gait                          Stair Negotiation: BUE supports, non-reciprocal    Sit<>Stand Transfers: Increased Pain    Bed Mobility: Increased Pain and time required to transfer    ROM / Strength                                                   AROM                      PROM                   Strength (1-5)      Left Right Left Right Left Right   Hip Flexion (0-120)         4- 4-     Extension (0-20)              IR (0-45)     4+ 4+    ER (0-45)     4 4     Abduction (0-45)             Knee Flexion (0-135) 85 95   4+ 4+     Extension (0) 8 8   4+ 4+   Ankle Plantarflexion (0-50)               Dorsiflexion (0-20)                Outcome Measures:  5 Times Sit to Stand: 38 seconds  Single Leg Balance Left: 1\" Right: \"  Rhomberg EO: 30\" EC: 30\" (increase sway)       min Patient Education:  YES  Reviewed HEP   []  Progressed/Changed HEP based on: Other Objective/Functional Measures:    See Above       Post Treatment Pain Level (on 0 to 10) scale:   8  / 10     ASSESSMENT    Assessment/Changes in Function:     See POC    Justification for Eval Code Complexity:  Patient History : High see above  Examination: Medium See Objective  Clinical Presentation: Medium Evolving  Clinical Decision Making : Mod - FOTO      []  See Progress Note/Recertification   Patient will continue to benefit from skilled PT services to analyze,, cue,, progress,, modify,, demonstrate,, instruct, and address, movement patterns,, therapeutic interventions,, postural abnormalities,, soft tissue restrictions,, ROM,, strength,, functional mobility,, body mechanics/ergonomics, and home and community integration, to attain remaining goals.    Progress toward goals / Updated goals:    See POC     PLAN    []  Upgrade activities as tolerated YES Continue plan of care   []  Discharge due to :    []  Other:      Therapist: Angle Reed DPT    Date: 2/14/2020 Time: 8:02 AM     Future Appointments   Date Time Provider Ra Freedman   2/14/2020  8:30 AM [facility] AT [facility]

## 2020-02-14 NOTE — PROGRESS NOTES
[address]  [facility] PHYSICAL THERAPY  [address] [facility] [address], [address] - Phone: [phone]  Fax: [phone] / [address] [facility]  Patient Name: Lex Montero : 1960   Medical   Diagnosis: Low back pain [M54.5]  Left knee pain [M25.562]  Right knee pain [M25.561] Treatment Diagnosis: Mechanical LBP, Bilateral Knee OA   Onset Date: Chronic     Referral Source: Pato Delgado MD [facility] [place]): 2020   Prior Hospitalization: See medical history Provider #: [id number]   Prior Level of Function: Sedentary, unemployed   Comorbidities: HTN, Asthma, OA, Diabetes, Obese   Medications: Verified on Patient Summary List   The Plan of Care and following information is based on the information from the initial evaluation.   ==========================================================================================  Assessment / key information:  Pt is a 62year old female who presents to PT today with complaints of lower back and bilateral knee pain. Pt arrived to session stating that she did not want to start aquatic therapy secondary to temperature and also reported that she did not know if she could begin physical therapy secondary to potential death in the family. Pt attempted to start physical therapy early in 2019 but self-discharged secondary death in the family. At that she did not complete any follow-up sessions. She agreed to start land-based therapy next week and potentially transition to aquatics in the Spring. Evaluation revealed decreased LE strength/ROM, impaired gait, decreased balance and increased pain. The resulting condition has affected their ability to perform ADL's. Patient with a Functional Status score of 25 (Knees) and 27 (Lumbar) on FOTO (Focused on Therapeutic Outcomes), which corresponds to a functional limitation of 75% and 73%.   Patient will benefit from skilled PT services to address these issues. Gait: No AD, [place], Waddling Gait                           Stair Negotiation: BUE supports, non-reciprocal     Sit<>Stand Transfers: Increased Pain     Bed Mobility: Increased Pain and time required to transfer     ROM / Strength                                                   AROM                    Strength (1-5)      Left Right Left Right   Hip Flexion (0-120)     4- 4-     Extension (0-20)             IR (0-45)     4+ 4+     ER (0-45)     4 4     Abduction (0-45)           Knee Flexion (0-135) 85 95 4+ 4+     Extension (0) 8 8 4+ 4+   Ankle Plantarflexion (0-50)             Dorsiflexion (0-20)              Outcome Measures:  5 Times Sit to Stand: 38 seconds  Single Leg Balance Left: 1\" Right: \"  Rhomberg EO: 30\" EC: 30\" (increase sway)    Pt was educated regarding their diagnosis and prognosis.  Thank you for this referral.   ==========================================================================================  Eval Complexity: History: HIGH Complexity :3+ comorbidities / personal factors will impact the outcome/ POC Exam:MEDIUM Complexity : 3 Standardized tests and measures addressing body structure, function, activity limitation and / or participation in recreation  Presentation: MEDIUM Complexity : Evolving with changing characteristics  Clinical Decision Making:MEDIUM Complexity : FOTO score of 26-74Overall Complexity:MEDIUM    Problem List: pain affecting function, decrease ROM, decrease strength, impaired gait/ balance, decrease ADL/ functional abilitiies, decrease activity tolerance, decrease flexibility/ joint mobility and decrease transfer abilities   Treatment Plan may include any combination of the following: Therapeutic exercise, Therapeutic activities, Neuromuscular re-education, Physical agent/modality, Gait/balance training, Manual therapy, Aquatic therapy, Patient education, Functional mobility training, Home safety training and Stair training  Patient / Family readiness to learn indicated by: asking questions, trying to perform skills and interest  Persons(s) to be included in education: patient (P)  Barriers to Learning/Limitations: None  Patient Goal (s): \"Getting my knees stronger, I dont know if yah can help my back\"   Patient self reported health status: fair  Rehabilitation Potential: good   Short Term Goals: To be accomplished in  3  weeks:  1) Patient will be compliant with HEP for symptom management at home. 2) Patient to tolerate greater than/equal to 40 minutes of land therapy to improve ADL tolerance  3) Patient will report less than or equal to 5/10 pain at worst during therapy to allow increased activity tolerance.  Long Term Goals: To be accomplished in  5  weeks:  1) Patient to be Safe and Independent with HEP to transition to self management/prevention after DC. 2) Patient will ambulate 15 minutes with LRAD to improve quality of life. 3) Patient will increase FOTO Functional Status (Knee) score to 40 to decrease functional limitations. 4) Patient will demonstrate hip flexor strength of at least 4+/5 to engage in age appropriate activities. 5) Patient will increase FOTO Functional Status (Lumbar) score to 41 to decrease functional limitations. 6) Patient will demonstrate 5 times sit to stand no greater than 29 seconds to indicate decreased falls risk. Frequency / Duration:   Patient to be seen  2-3  times per week for 10  treatments:  Patient / Caregiver education and instruction: provided education regarding diagnosis and prognosis as well as details regarding treatment plain. Therapist Signature: Gina Knott DPT Date: 8/96/7808   Certification Period: NA Time: 8:03 AM   ===========================================================================================  I certify that the above Physical Therapy Services are being furnished while the patient is under my care.   I agree with the treatment plan and certify that this therapy is necessary. Physician Signature:        Date:       Time:     Please sign and return to [facility] or you may fax the signed copy to [phone]. Thank you.

## 2020-02-20 ENCOUNTER — APPOINTMENT (OUTPATIENT)
Dept: PHYSICAL THERAPY | Age: 60
End: 2020-02-20
Payer: MEDICAID

## 2020-02-21 ENCOUNTER — APPOINTMENT (OUTPATIENT)
Dept: PHYSICAL THERAPY | Age: 60
End: 2020-02-21
Payer: MEDICAID

## 2020-02-25 ENCOUNTER — APPOINTMENT (OUTPATIENT)
Dept: PHYSICAL THERAPY | Age: 60
End: 2020-02-25
Payer: MEDICAID

## 2020-02-28 ENCOUNTER — APPOINTMENT (OUTPATIENT)
Dept: PHYSICAL THERAPY | Age: 60
End: 2020-02-28
Payer: MEDICAID

## 2020-03-02 ENCOUNTER — HOSPITAL ENCOUNTER (OUTPATIENT)
Dept: PHYSICAL THERAPY | Age: 60
Discharge: HOME | End: 2020-03-02
Payer: MEDICAID

## 2020-03-02 PROCEDURE — 97110 THERAPEUTIC EXERCISES: CPT

## 2020-03-02 NOTE — PROGRESS NOTES
PHYSICAL THERAPY - DAILY TREATMENT NOTE    Patient Name: Lex Montero        Date: 3/2/2020  : 1960   YES Patient  Verified  Visit #:   2   of   10  Insurance: Payor: Reina Backbone / [facility] / Product Type: Managed Care Medicaid /      In time: 8:05 Out time: 8:36   Total Treatment Time: 31     Medicare/BCBS [place] Time Tracking (below)   Total Timed Codes (min):  na 1:1 Treatment Time:  na     TREATMENT AREA =  Low back pain [M54.5]  Left knee pain [M25.562]  Right knee pain [M25.561]    SUBJECTIVE    Pain Level (on 0 to 10 scale):  7  / 10   Medication Changes/New allergies or changes in medical history, any new surgeries or procedures? NO    If yes, update Summary List   Subjective Functional Status/Changes:  []  No changes reported     Pt reports felt just a little sore after her initial evaluation. OBJECTIVE    31 min Therapeutic Exercise:  [x]  See flow sheet   Rationale:    increase ROM, increase strength, improve coordination, improve balance and increase proprioception to promote increased functional mobility and increased activity tolerance with ADL's.      min Patient Education:  YES  Reviewed HEP   []  Progressed/Changed HEP based on:   No HEP currently, educated pt on DOMS and to assess response to initiation of therapeutic activity. Other Objective/Functional Measures:    Initiated NuStep for warm up and therapeutic exercise per flow sheet- pt with fair tolerance. Pt required min A for trunk with supine>sit. Pt with c/o intermittent knee and LBP with bed mobility and therapeutic exercise. Mod to max VCing for form. Post Treatment Pain Level (on 0 to 10) scale:    10     ASSESSMENT    Assessment/Changes in Function:     Pt with fair tolerance to initiation of therapeutic exercise.       []  See Progress Note/Recertification   Patient will continue to benefit from skilled PT services to modify and progress therapeutic interventions, address functional mobility deficits, address ROM deficits, address strength deficits, analyze and address soft tissue restrictions, analyze and cue movement patterns, assess and modify postural abnormalities and instruct in home and community integration to attain remaining goals. Progress toward goals / Updated goals:    1) Patient will be compliant with HEP for symptom management at home. 2) Patient to tolerate greater than/equal to 40 minutes of land therapy to improve ADL tolerance  3) Patient will report less than or equal to 5/10 pain at worst during therapy to allow increased activity tolerance.  Pt with c/o intermittent increased pain with initiation of therapeutic exercise     PLAN    []  Upgrade activities as tolerated YES Continue plan of care   []  Discharge due to :    []  Other:      Therapist: Sabrina Chamorro PTA    Date: 3/2/2020 Time: 9:04 AM     Future Appointments   Date Time Provider Ra Freedman   3/5/2020  7:30 AM [facility]   3/9/2020  8:00 AM [facility]   3/13/2020  8:00 AM [address] [place] [address]   3/19/2020  8:30 AM [facility]   3/23/2020  8:00 AM [facility]   3/26/2020  8:00 AM [facility]

## 2020-03-05 ENCOUNTER — HOSPITAL ENCOUNTER (OUTPATIENT)
Dept: PHYSICAL THERAPY | Age: 60
Discharge: HOME | End: 2020-03-05
Payer: MEDICAID

## 2020-03-05 PROCEDURE — 97110 THERAPEUTIC EXERCISES: CPT

## 2020-03-05 NOTE — PROGRESS NOTES
PHYSICAL THERAPY - DAILY TREATMENT NOTE    Patient Name: Christopher Crenshaw        Date: 3/5/2020  : 1960   YES Patient  Verified  Visit #:   3   of   10  Insurance: Payor: Alexsandra Verdugo / Plan: Jorge Broussard / Product Type: Managed Care Medicaid /      In time: 211 Out time: 800   Total Treatment Time: 33     Medicare/BCBS Time Tracking (below)   Total Timed Codes (min):  NA 1:1 Treatment Time:  NA     TREATMENT AREA =  Low back pain [M54.5]  Left knee pain [M25.562]  Right knee pain [M25.561]    SUBJECTIVE    Pain Level (on 0 to 10 scale):  7  / 10   Medication Changes/New allergies or changes in medical history, any new surgeries or procedures? NO    If yes, update Summary List   Subjective Functional Status/Changes:  []  No changes reported     Pt reports that her legs were sore the evening after her last session and she stayed sore for 2 days. Primary complaints of left knee pain today          OBJECTIVE  Therapeutic Procedures:  Min Procedure Specifics + Rationale   (33) [x] Therapeutic Exercise    See Flowsheet   Rationale: increase ROM and increase strength to improve the patients ability to participate in ADL's         min Patient Education:  YES Reviewed HEP         Other Objective/Functional Measures:    See flowsheet for more details    Added seated quad sets to address knee extension ROM and quad strength  Required ~2 minute rest between exercises due to decreased activity tolerance. Much of session consisted of pt education regarding intended physical responses to treatment. mod VC and demos required throughout session for proper form and increased safety    Progressed therex per flowsheet     Post Treatment Pain Level (on 0 to 10) scale:   6  / 10     ASSESSMENT    Assessment/Changes in Function:     Patient tolerated treatment session well and is progressing well towards goals. Decreased symptoms at end of session. Improved activity tolerance. Provided HEP.       []  See Progress Note/Recertification   Patient will continue to benefit from skilled PT services to analyze,, cue,, progress,, modify,, demonstrate,, instruct, and address, movement patterns,, therapeutic interventions,, postural abnormalities,, soft tissue restrictions,, ROM,, strength,, functional mobility,, body mechanics/ergonomics, and home and community integration, to attain remaining goals.    Progress toward goals / Updated goals:    Provided HEP      PLAN    [x]  Upgrade activities as tolerated YES Continue plan of care   []  Discharge due to :    []  Other:      Therapist: Beula Frankel, DPT    Date: 3/5/2020 Time: 7:26 AM     Future Appointments   Date Time Provider Ra Freedman   3/5/2020  7:30 AM [facility]   3/9/2020  8:00 AM [facility]   3/13/2020  8:00 AM [address] [place] [address]   3/19/2020  8:30 AM [facility]   3/23/2020  8:00 AM [facility]   3/26/2020  8:00 AM [facility]

## 2020-03-09 ENCOUNTER — HOSPITAL ENCOUNTER (OUTPATIENT)
Dept: PHYSICAL THERAPY | Age: 60
Discharge: HOME | End: 2020-03-09
Payer: MEDICAID

## 2020-03-09 PROCEDURE — 97110 THERAPEUTIC EXERCISES: CPT

## 2020-03-09 NOTE — PROGRESS NOTES
PHYSICAL THERAPY - DAILY TREATMENT NOTE    Patient Name: Nasir Hurtado        Date: 3/9/2020  : 1960   YES Patient  Verified  Visit #:   4   of   10  Insurance: Payor: Fabrice Guzman / Plan: [address] Hern[address]zmerry Broussard / Product Type: Managed Care Medicaid /      In time: 8:00 Out time: 8:32   Total Treatment Time: 32     Medicare/BCBS [place] Time Tracking (below)   Total Timed Codes (min):  na 1:1 Treatment Time:  na     TREATMENT AREA =  Low back pain [M54.5]  Left knee pain [M25.562]  Right knee pain [M25.561]    SUBJECTIVE    Pain Level (on 0 to 10 scale):  6  / 10   Medication Changes/New allergies or changes in medical history, any new surgeries or procedures? NO    If yes, update Summary List   Subjective Functional Status/Changes:  []  No changes reported     Pt reports she wasn't as sore after her last PT session as she was the time before. Pt states her left knee is giving out on her some and it's hard for her to lift the left leg (like getting in and out of car), reports she is scheduled to get another back injection for her left side  and is hoping it will take because it helped her right side. OBJECTIVE    32 min Therapeutic Exercise:  [x]  See flow sheet   Rationale:    increase ROM, increase strength, improve coordination, improve balance and increase proprioception to promote increased functional mobility and increased activity tolerance with ADL's.      min Patient Education:  YES  Reviewed HEP   []  Progressed/Changed HEP based on:   Reprinted exercises per pt request, thinks she lost them     Other Objective/Functional Measures: Added knee flexion at step for ROM and SAQ's for strengthening. Pt states she does not \"feel\" the muscles in her left leg working like she does the right when she is doing the exercises. Pt reports moderate ms fatigue with therapeutic exercise, requires frequent short rest breaks secondary to fatigue.       Post Treatment Pain Level (on 0 to 10) scale:   6  / 10     ASSESSMENT    Assessment/Changes in Function:     Pt reporting less DOMS after last PT session. []  See Progress Note/Recertification   Patient will continue to benefit from skilled PT services to modify and progress therapeutic interventions, address functional mobility deficits, address ROM deficits, address strength deficits, analyze and address soft tissue restrictions, analyze and cue movement patterns, assess and modify postural abnormalities and instruct in home and community integration to attain remaining goals. .     Progress toward goals / Updated goals:    1) Patient will be compliant with HEP for symptom management at home. 2) Patient to tolerate greater than/equal to 40 minutes of land therapy to improve ADL tolerance  3) Patient will report less than or equal to 5/10 pain at worst during therapy to allow increased activity tolerance.  Pt reporting decreased pain intensity today      PLAN    []  Upgrade activities as tolerated YES Continue plan of care   []  Discharge due to :    []  Other:      Therapist: Ousmane Ojeda PTA    Date: 3/9/2020 Time: 8:38 AM     Future Appointments   Date Time Provider Ra Freedman   3/13/2020  8:00 AM [address] [place] [address]   3/19/2020  8:30 AM [address] [place] [address]   [facility]   3/26/2020  8:00 [facility]

## 2020-03-13 ENCOUNTER — APPOINTMENT (OUTPATIENT)
Dept: PHYSICAL THERAPY | Age: 60
End: 2020-03-13
Payer: MEDICAID

## 2020-03-19 ENCOUNTER — APPOINTMENT (OUTPATIENT)
Dept: PHYSICAL THERAPY | Age: 60
End: 2020-03-19
Payer: MEDICAID

## 2020-03-23 ENCOUNTER — APPOINTMENT (OUTPATIENT)
Dept: PHYSICAL THERAPY | Age: 60
End: 2020-03-23
Payer: MEDICAID

## 2020-03-26 ENCOUNTER — APPOINTMENT (OUTPATIENT)
Dept: PHYSICAL THERAPY | Age: 60
End: 2020-03-26
Payer: MEDICAID

## 2020-03-30 NOTE — PROGRESS NOTES
[address]  [facility] PHYSICAL THERAPY  [address] [facility] D[address]ust[address], [address] - Phone: [phone]  Fax: [phone]  PROGRESS NOTE  Patient Name: Aurelia Barth : 1960   Treatment/Medical Diagnosis: Low back pain [M54.5]  Left knee pain [M25.562]  Right knee pain [M25.561]   Referral Source: Rodger Ontiveros MD     Date of Initial Visit: 2020 Attended Visits: 4 Missed Visits: 0     Patient has been progressing towards goals; however, has requested to be placed on hold at this time due public health concerns for [address]. Patient has been issued an HEP with option of PT sending out an updated HEP via e-mail as needed. Additionally, tele-health appointments (e-visits) may be provided as available if appropriate and desired by patient. If patient's symptoms return and are unable to be managed with exercises at home, patient has been educated to follow-up with PT to be taken off hold as appropriate. Patient care will resume at previously established frequency when public health crisis has subsided. If you have any questions/comments please contact us directly at [phone]. Thank you for allowing us to assist in the care of your patient.     Therapist Signature: Dorys Jung PT Date: 3/30/2020     Time: 12:36 PM

## 2020-05-28 NOTE — PROGRESS NOTES
[address]  [facility] PHYSICAL THERAPY  [address] [facility] [address], [address] - Phone: [phone]  Fax: [phone]  DISCHARGE SUMMARY FOR PHYSICAL THERAPY          Patient Name: Timo Poe : 1960   Treatment/Medical Diagnosis: Low back pain [M54.5]  Left knee pain [M25.562]  Right knee pain [M25.561]   Onset Date: Chronic    Referral Source: Sedrick Carrillo MD [facility]): 2020   Prior Hospitalization: See medical hx Provider #: [id number]   Prior Level of Function: Sedentary, unemployed   Comorbidities: HTN, asthma, OA, DM, Obeses   Medications: Verified on Patient Summary List   Visits from [facility]: 4 Missed Visits: 0       Key Functional Changes/Progress: Under your discretion this pt was evaluated and seen for Physical Therapy at our clinic. Due to COVID-19 your patient was placed on hold and has not been seen since 2020. At that time they had completed x4 visits. We recently reached out to our patients who were placed on hold to inquire regarding their desire for continued physical therapy however was unable to reach patient despite multiple attempts. If patient wants to return to therapy they will simply need a new prescription. We appreciate the kind referral and would willingly work with this patient again should they need continued services. Your patient's health is our primary concern. Assessments/Recommendations: Other: Per pt request    If you have any questions/comments please contact us directly at [phone]. Thank you for allowing us to assist in the care of your patient. Therapist Signature:   Kishan Grider DPT Date: 2020   Reporting Period: 2020-2020 Time: 5:04 PM      Certification Period: LOBO

## 2020-12-22 ENCOUNTER — OFFICE VISIT (OUTPATIENT)
Dept: ORTHOPEDIC SURGERY | Age: 60
End: 2020-12-22
Payer: MEDICAID

## 2020-12-22 VITALS
OXYGEN SATURATION: 96 % | SYSTOLIC BLOOD PRESSURE: 131 MMHG | HEIGHT: 67 IN | RESPIRATION RATE: 16 BRPM | HEART RATE: 86 BPM | WEIGHT: 293 LBS | TEMPERATURE: 97.1 F | DIASTOLIC BLOOD PRESSURE: 88 MMHG | BODY MASS INDEX: 45.99 KG/M2

## 2020-12-22 DIAGNOSIS — M17.11 PRIMARY OSTEOARTHRITIS OF RIGHT KNEE: ICD-10-CM

## 2020-12-22 DIAGNOSIS — G89.29 CHRONIC PAIN OF LEFT KNEE: ICD-10-CM

## 2020-12-22 DIAGNOSIS — M25.562 CHRONIC PAIN OF LEFT KNEE: ICD-10-CM

## 2020-12-22 DIAGNOSIS — M17.12 PRIMARY OSTEOARTHRITIS OF LEFT KNEE: Primary | ICD-10-CM

## 2020-12-22 DIAGNOSIS — G89.29 CHRONIC PAIN OF RIGHT KNEE: ICD-10-CM

## 2020-12-22 DIAGNOSIS — M25.561 CHRONIC PAIN OF RIGHT KNEE: ICD-10-CM

## 2020-12-22 PROCEDURE — 99213 OFFICE O/P EST LOW 20 MIN: CPT | Performed by: SPECIALIST

## 2020-12-22 NOTE — PATIENT INSTRUCTIONS
Knee Arthritis: Exercises Introduction Here are some examples of exercises for you to try. The exercises may be suggested for a condition or for rehabilitation. Start each exercise slowly. Ease off the exercises if you start to have pain. You will be told when to start these exercises and which ones will work best for you. How to do the exercises Knee flexion with heel slide 1. Lie on your back with your knees bent. 2. Slide your heel back by bending your affected knee as far as you can. Then hook your other foot around your ankle to help pull your heel even farther back. 3. Hold for about 6 seconds, then rest for up to 10 seconds. 4. Repeat 8 to 12 times. 5. Switch legs and repeat steps 1 through 4, even if only one knee is sore. [place] 1. Sit with your affected leg straight and supported on the floor or a firm bed. Place a small, rolled-up towel under your knee. Your other leg should be bent, with that foot flat on the floor. 2. Tighten the thigh muscles of your affected leg by pressing the back of your knee down into the towel. 3. Hold for about 6 seconds, then rest for up to 10 seconds. 4. Repeat 8 to 12 times. 5. Switch legs and repeat steps 1 through 4, even if only one knee is sore. Straight-leg raises to the front 1. Lie on your back with your good knee bent so that your foot rests flat on the floor. Your affected leg should be straight. Make sure that your low back has a normal curve. You should be able to slip your hand in between the floor and the small of your back, with your palm touching the floor and your back touching the back of your hand. 2. Tighten the thigh muscles in your affected leg by pressing the back of your knee flat down to the floor. Hold your knee straight. 3. Keeping the thigh muscles tight and your leg straight, lift your affected leg up so that your heel is about 12 inches off the floor. Hold for about 6 seconds, then lower slowly. 4. Relax for up to 10 seconds between repetitions. 5. Repeat 8 to 12 times. 6. Switch legs and repeat steps 1 through 5, even if only one knee is sore. Active knee flexion 1. Lie on your stomach with your knees straight. If your kneecap is uncomfortable, roll up a washcloth and put it under your leg just above your kneecap. 2. Lift the foot of your affected leg by bending the knee so that you bring the foot up toward your buttock. If this motion hurts, try it without bending your knee quite as far. This may help you avoid any painful motion. 3. Slowly move your leg up and down. 4. Repeat 8 to 12 times. 5. Switch legs and repeat steps 1 through 4, even if only one knee is sore. Quadriceps stretch (facedown) 1. Lie flat on your stomach, and rest your face on the floor. 2. Wrap a towel or belt strap around the lower part of your affected leg. Then use the towel or belt strap to slowly pull your heel toward your buttock until you feel a stretch. 3. Hold for about 15 to 30 seconds, then relax your leg against the towel or belt strap. 4. Repeat 2 to 4 times. 5. Switch legs and repeat steps 1 through 4, even if only one knee is sore. Stationary exercise bike 1. If you do not have a stationary exercise bike at home, you can find one to ride at your local health club or community center. 2. Adjust the height of the bike seat so that your knee is slightly bent when your leg is extended downward. If your knee hurts when the pedal reaches the top, you can raise the seat so that your knee does not bend as much. 3. Start slowly. At first, try to do 5 to 10 minutes of cycling with little to no resistance. Then increase your time and the resistance bit by bit until you can do 20 to 30 minutes without pain. 4. If you start to have pain, rest your knee until your pain gets back to the level that is normal for you. Or cycle for less time or with less effort. Follow-up care is a key part of your treatment and safety. Be sure to make and go to all appointments, and call your doctor if you are having problems. It's also a good idea to know your test results and keep a list of the medicines you take. Where can you learn more? Go to http://www.garcia.com/ Enter C159 in the search box to learn more about \"Knee Arthritis: Exercises. \" Current as of: March 2, 2020               Content Version: 12.6 © 2006-2020 [employer]. Care instructions adapted under license by [employer] (which disclaims liability or warranty for this information). If you have questions about a medical condition or this instruction, always ask your healthcare professional. [address] any warranty or liability for your use of this information.

## 2020-12-22 NOTE — PROGRESS NOTES
Patient: Su Cedillo                MRN: [id number]       SSN: [id number]  YOB: 1960        AGE: 56 y.o. SEX: female      PCP: Robyn Nicole MD  12/22/20    Chief Complaint   Patient presents with    Knee Pain     percy knee pain      HISTORY:  Su Cedillo is a 56 y.o. female who is seen for bilateral knee pain L>R. She reports occasional swelling in her knees. She feels as though her left knee is going to give out on her. She has pain with walking. She has very sharp pain in her knees. She feels grinding and popping sensations. She reports relief from aquatic physical therapy & Euflexxa series completed in April 2016 and 2/28/19. Previous cortisone injections did not help as much as she would have liked. She was previously seen for back pain and degenerative scoliosis. She was previously seen by Dr. Cathy Galvan at the spine center. Pain Assessment  12/22/2020   Location of Pain Knee   Location Modifiers Right;Left   Severity of Pain 8   Quality of Pain Sharp;Dull   Quality of Pain Comment -   Duration of Pain Persistent   Frequency of Pain Constant   Aggravating Factors Walking   Aggravating Factors Comment -   Limiting Behavior -   Relieving Factors Rest;NSAID   Relieving Factors Comment -   Result of Injury No     Occupation, etc: Ms. Kiki Rubio receives [address] [facility] [address] for hypertension and diabetes. She quit working for the [employer] crew as a  for [facility] [employer] [facility] [place] [employer] seven years ago after her work injury. Her grand daughter drives her around since she does not drive. She lives alone in [place]. She has 2 children with several grandchildren in the area. Her aunt that she was close to passed in 01/2019. She lost 10 pounds by cutting out fried foods and sodas. Current weight is 309 pounds. Current height is 5'7\". Last 3 Recorded Weights in this Encounter    12/22/20 0815   Weight: 309 lb (140.2 kg)     Body mass index is 48.4 kg/m².     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 from DM M79.2    Obesity, morbid ([facility] [address] E66.01       REVIEW OF SYSTEMS: All Below are Negative except: See HPI     Constitutional: negative for fever, chills, and weight loss. Cardiovascular: negative for chest pain, claudication, leg swelling, SOB, PAGE   Gastrointestinal: Negative for pain, N/V/C/D, Blood in stool or urine, dysuria,  hematuria, incontinence, pelvic pain. Musculoskeletal: See HPI   Neurological: Negative for dizziness and weakness. Negative for headaches, Visual changes, confusion, seizures   Phychiatric/Behavioral: Negative for depression, memory loss, substance  abuse. Extremities: Negative for hair changes, rash, or skin lesion changes. Hematologic: Negative for bleeding problems, bruising, pallor or swollen lymph  nodes   Peripheral Vascular: No calf pain, no circulation deficits.     Social History     Socioeconomic History    Marital status:      Spouse name: Not on file    Number of children: Not on file    Years of education: Not on file    Highest education level: Not on file   Occupational History    Not on file   Social Needs    Financial resource strain: Not on file    Food insecurity     Worry: Not on file     Inability: Not on file    Transportation needs     Medical: Not on file     Non-medical: Not on file   Tobacco Use    Smoking status: Former Smoker    Smokeless tobacco: Never Used   Substance and Sexual Activity    Alcohol use: No    Drug use: No    Sexual activity: Not Currently   Lifestyle    Physical activity     Days per week: Not on file     Minutes per session: Not on file    Stress: Not on file   Relationships    Social connections     Talks on phone: Not on file     Gets together: Not on file     Attends Anabaptism service: Not on file     Active member of club or organization: Not on file     Attends meetings of clubs or organizations: Not on file     Relationship status: Not on file    Intimate partner violence     Fear of current or ex partner: Not on file     Emotionally abused: Not on file     Physically abused: Not on file     Forced sexual activity: Not on file   Other Topics Concern    Not on file   Social History Narrative    ** Merged History Encounter **             Allergies   Allergen Reactions    Aspirin Nausea and Vomiting     itching    Vicodin [Hydrocodone-Acetaminophen] Itching        Current Outpatient Medications   Medication Sig    gabapentin (NEURONTIN) 300 mg capsule Take 1 Cap by mouth three (3) times daily. Indications: NEUROPATHIC PAIN    diclofenac EC (VOLTAREN) 75 mg EC tablet take 1 tablet by mouth twice a day    oxyCODONE-acetaminophen (PERCOCET) 7.5-325 mg per tablet Take 1 tab PO every day to TID as needed for pain. DO NOT FILL:5/30/17    amLODIPine (NORVASC) 5 mg tablet 5 mg.  aspirin delayed-release 81 mg tablet 81 mg.    glipiZIDE SR (GLUCOTROL XL) 2.5 mg CR tablet 2.5 mg.    hydroCHLOROthiazide (HYDRODIURIL) 25 mg tablet 25 mg.     pravastatin (PRAVACHOL) 20 mg tablet 40 mg.    BD INSULIN PEN NEEDLE UF MINI 31 gauge x 3/16\" ndle     albuterol (PROVENTIL VENTOLIN) 2.5 mg /3 mL (0.083 %) nebulizer solution     LANTUS SOLOSTAR 100 unit/mL (3 mL) pen inject 60 units subcutaneously at bedtime    MUCINEX 600 mg ER tablet     BYETTA 5 mcg/dose (250 mcg/mL) 1.2 mL injection     CONTOUR METER misc use as directed    BD INSULIN SYRINGE ULTRA-FINE 0.5 mL 31 gauge x 5/16 syrg USE THREE TIMES DAILY AS DIRECTED    BD INSULIN PEN NEEDLE UF ORIG 29 gauge x 1/2\" ndle USE 4 TIMES DAILY    ASCENSIA CONTOUR strip USE 1 STRIP 3 TIMES DAILY    fluticasone (FLONASE) 50 mcg/actuation nasal spray instill 1 spray into each nostril twice a day    polyethylene glycol (MIRALAX) 17 gram/dose powder MIX 17 GM IN  8 OZ OF LIQUID AND DRINL 1 TO 2 TIMES DAILY FOR CONSTIPATION    NOVOLOG FLEXPEN 100 unit/mL inpn     metoprolol succinate (TOPROL-XL) 25 mg XL tablet     ADVAIR DISKUS 250-50 mcg/dose diskus inhaler     atorvastatin (LIPITOR) 20 mg tablet Take 20 mg by mouth daily.  PROAIR HFA 90 mcg/actuation inhaler     cetirizine (ZYRTEC) 10 mg tablet Take 10 mg by mouth nightly.  montelukast (SINGULAIR) 10 mg tablet Take 10 mg by mouth daily.  ranitidine (ZANTAC) 150 mg tablet Take 150 mg by mouth two (2) times a day.  chlorthalidone (HYGROTEN) 25 mg tablet Take 25 mg by mouth daily.  ibuprofen (MOTRIN IB) 200 mg tablet Take  by mouth.  metFORMIN (GLUCOPHAGE) 500 mg tablet Take 500 mg by mouth two (2) times daily (with meals). No current facility-administered medications for this visit. PHYSICAL EXAMINATION:  Visit Vitals  /88 (BP 1 Location: Left arm, BP Patient Position: Sitting)   Pulse 86   Temp 97.1 °F (36.2 °C) (Temporal)   Resp 16   Ht 5' 7\" (1.702 m)   Wt 309 lb (140.2 kg)   SpO2 96%   BMI 48.40 kg/m²        ORTHO EXAMINATION:  Examination Left knee Right knee   Skin Intact Intact   Range of motion 95-5 95-5   Effusion + -   Medial joint line tenderness + +   Lateral joint line tenderness - -   Popliteal tenderness - -   Osteophytes palpable + +   Tommys - -   Patella crepitus + +   Anterior drawer - -   Lateral laxity - -   Medial laxity - -   Varus deformity - -   Valgus deformity - -   Pretibial edema - -   Calf tenderness - -   t     RADIOGRAPHS:  XR MALDONADO KNEE 12/14/18 RODNEY  IMPRESSION:  Three views - No fractures, no effusion, severe medial joint space narrowing, + osteophytes present.  IKDC Grade D    XR KNEE RIGHT 1/16/15  IMPRESSION:  1. DJD most pronounced medially and patellofemoral joint with large joint effusion. 2. No acute osseous finding. XR LEFT KNEE 11/21/12  IMPRESSION: No acute osseous abnormality. Tricompartmental degenerative  changes. Joint effusion, suspicious for internal derangement. IMPRESSION:      ICD-10-CM ICD-9-CM    1. Primary osteoarthritis of left knee  M17.12 715.16 PROCEDURE AUTHORIZATION TO    2. Chronic pain of left knee  M25.562 719.46 PROCEDURE AUTHORIZATION TO     G89.29 338.29    3. Primary osteoarthritis of right knee  M17.11 715.16 PROCEDURE AUTHORIZATION TO    4. Chronic pain of right knee  M25.561 719.46 PROCEDURE AUTHORIZATION TO     G89.29 338.29      PLAN: Knee exercises provided. Consider visco supplementation if pain continues. Dietary counseling provided today. Start weight loss with low carb diet and intermittent fasting. We discussed possible need for knee arthroplasty at some time in the future if pain continues further weight loss. There is no need for surgery at this time. She will follow up as needed.      Scribed by Nathaniel Simpson (Hoda Gavin) as dictated by Brijesh Jordan MD

## 2021-01-22 ENCOUNTER — OFFICE VISIT (OUTPATIENT)
Dept: ORTHOPEDIC SURGERY | Age: 61
End: 2021-01-22
Payer: MEDICAID

## 2021-01-22 VITALS
DIASTOLIC BLOOD PRESSURE: 97 MMHG | RESPIRATION RATE: 16 BRPM | TEMPERATURE: 97.7 F | BODY MASS INDEX: 45.99 KG/M2 | HEIGHT: 67 IN | OXYGEN SATURATION: 96 % | HEART RATE: 85 BPM | WEIGHT: 293 LBS | SYSTOLIC BLOOD PRESSURE: 175 MMHG

## 2021-01-22 DIAGNOSIS — G89.29 CHRONIC LOW BACK PAIN WITHOUT SCIATICA, UNSPECIFIED BACK PAIN LATERALITY: ICD-10-CM

## 2021-01-22 DIAGNOSIS — M17.11 PRIMARY OSTEOARTHRITIS OF RIGHT KNEE: ICD-10-CM

## 2021-01-22 DIAGNOSIS — G89.29 CHRONIC PAIN OF RIGHT KNEE: ICD-10-CM

## 2021-01-22 DIAGNOSIS — G89.29 CHRONIC PAIN OF LEFT KNEE: ICD-10-CM

## 2021-01-22 DIAGNOSIS — M25.562 CHRONIC PAIN OF LEFT KNEE: ICD-10-CM

## 2021-01-22 DIAGNOSIS — M25.561 CHRONIC PAIN OF RIGHT KNEE: ICD-10-CM

## 2021-01-22 DIAGNOSIS — M17.12 PRIMARY OSTEOARTHRITIS OF LEFT KNEE: Primary | ICD-10-CM

## 2021-01-22 DIAGNOSIS — M54.50 CHRONIC LOW BACK PAIN WITHOUT SCIATICA, UNSPECIFIED BACK PAIN LATERALITY: ICD-10-CM

## 2021-01-22 PROCEDURE — 20610 DRAIN/INJ JOINT/BURSA W/O US: CPT | Performed by: SPECIALIST

## 2021-01-22 PROCEDURE — 99213 OFFICE O/P EST LOW 20 MIN: CPT | Performed by: SPECIALIST

## 2021-01-22 NOTE — PROGRESS NOTES
Patient: Sherren Duel                MRN: [id number]       SSN: [id number]  YOB: 1960        AGE: 56 y.o. SEX: female      PCP: Arielle Dorsey MD  01/22/21    CC:  Back and knee pains     HISTORY:  Sherren Duel is a 56 y.o. female who is seen for bilateral knee pain L>R. She reports occasional swelling in her knees. She feels as though her left knee is going to give out on her. She has pain with walking. She feels a very sharp pain in her knees. She feels grinding and popping sensations. She reports relief from aquatic physical therapy & Euflexxa series completed in April 2016 and 2/28/19. Previous cortisone injections did not help as much as she would have liked. She is also seen for back pain and degenerative scoliosis. She was previously seen by Dr. Patirce Stacy [facility]. She is now in pain management at [facility]. She is not interested in physical therapy until the pandemic is over. Pain Assessment  1/22/2021   Location of Pain Knee   Location Modifiers Left;Right   Severity of Pain 8   Quality of Pain Throbbing; Sharp;Dull;Aching   Quality of Pain Comment -   Duration of Pain Persistent   Frequency of Pain Constant   Aggravating Factors Standing;Walking;Stairs   Aggravating Factors Comment -   Limiting Behavior No   Relieving Factors Rest   Relieving Factors Comment -   Result of Injury No     Occupation, etc: Ms. Sangita Reeves receives [address] [place] [address] for hypertension and diabetes. She quit working for the [employer] crew as a  for [facility] [employer] seven years ago after her work injury. Her grand daughter drives her around since she does not drive. She lives alone in [place]. She has 2 children with several grandchildren in the area. Her aunt that she was close to passed in 01/2019. She lost 10 pounds by cutting out fried foods and sodas. Current weight is 309 pounds. Current height is 5'7\".      Last 3 Recorded Weights in this Encounter    01/22/21 8088 Weight: 309 lb 9.6 oz (140.4 kg)     Body mass index is 48.49 kg/m². 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 from DM M79.2    Obesity, morbid ([facility] [address] E66.01       REVIEW OF SYSTEMS: All Below are Negative except: See HPI     Constitutional: negative for fever, chills, and weight loss. Cardiovascular: negative for chest pain, claudication, leg swelling, SOB, PAGE   Gastrointestinal: Negative for pain, N/V/C/D, Blood in stool or urine, dysuria,  hematuria, incontinence, pelvic pain. Musculoskeletal: See HPI   Neurological: Negative for dizziness and weakness. Negative for headaches, Visual changes, confusion, seizures   Phychiatric/Behavioral: Negative for depression, memory loss, substance  abuse. Extremities: Negative for hair changes, rash, or skin lesion changes. Hematologic: Negative for bleeding problems, bruising, pallor or swollen lymph  nodes   Peripheral Vascular: No calf pain, no circulation deficits.     Social History     Socioeconomic History    Marital status:      Spouse name: Not on file    Number of children: Not on file    Years of education: Not on file    Highest education level: Not on file   Occupational History    Not on file   Social Needs    Financial resource strain: Not on file    Food insecurity     Worry: Not on file     Inability: Not on file    Transportation needs     Medical: Not on file     Non-medical: Not on file   Tobacco Use    Smoking status: Former Smoker    Smokeless tobacco: Never Used   Substance and Sexual Activity    Alcohol use: No    Drug use: No    Sexual activity: Not Currently   Lifestyle    Physical activity     Days per week: Not on file     Minutes per session: Not on file    Stress: Not on file   Relationships    Social connections     Talks on phone: Not on file     Gets together: Not on file     Attends Zoroastrian service: Not on file     Active member of club or organization: Not on file     Attends meetings of clubs or organizations: Not on file     Relationship status: Not on file    Intimate partner violence     Fear of current or ex partner: Not on file     Emotionally abused: Not on file     Physically abused: Not on file     Forced sexual activity: Not on file   Other Topics Concern    Not on file   Social History Narrative    ** Merged History Encounter **             Allergies   Allergen Reactions    Aspirin Nausea and Vomiting     itching    Vicodin [Hydrocodone-Acetaminophen] Itching        Current Outpatient Medications   Medication Sig    gabapentin (NEURONTIN) 300 mg capsule Take 1 Cap by mouth three (3) times daily. Indications: NEUROPATHIC PAIN    diclofenac EC (VOLTAREN) 75 mg EC tablet take 1 tablet by mouth twice a day    oxyCODONE-acetaminophen (PERCOCET) 7.5-325 mg per tablet Take 1 tab PO every day to TID as needed for pain. DO NOT FILL:5/30/17    amLODIPine (NORVASC) 5 mg tablet 5 mg.  aspirin delayed-release 81 mg tablet 81 mg.    glipiZIDE SR (GLUCOTROL XL) 2.5 mg CR tablet 2.5 mg.    hydroCHLOROthiazide (HYDRODIURIL) 25 mg tablet 25 mg.     pravastatin (PRAVACHOL) 20 mg tablet 40 mg.    BD INSULIN PEN NEEDLE UF MINI 31 gauge x 3/16\" ndle     albuterol (PROVENTIL VENTOLIN) 2.5 mg /3 mL (0.083 %) nebulizer solution     LANTUS SOLOSTAR 100 unit/mL (3 mL) pen inject 60 units subcutaneously at bedtime    MUCINEX 600 mg ER tablet     BYETTA 5 mcg/dose (250 mcg/mL) 1.2 mL injection     CONTOUR METER misc use as directed    BD INSULIN SYRINGE ULTRA-FINE 0.5 mL 31 gauge x 5/16 syrg USE THREE TIMES DAILY AS DIRECTED    BD INSULIN PEN NEEDLE UF ORIG 29 gauge x 1/2\" ndle USE 4 TIMES DAILY    ASCENSIA CONTOUR strip USE 1 STRIP 3 TIMES DAILY    fluticasone (FLONASE) 50 mcg/actuation nasal spray instill 1 spray into each nostril twice a day    polyethylene glycol (MIRALAX) 17 gram/dose powder MIX 17 GM IN  8 OZ OF LIQUID AND DRINL 1 TO 2 TIMES DAILY FOR CONSTIPATION    NOVOLOG FLEXPEN 100 unit/mL inpn     metoprolol succinate (TOPROL-XL) 25 mg XL tablet     ADVAIR DISKUS 250-50 mcg/dose diskus inhaler     atorvastatin (LIPITOR) 20 mg tablet Take 20 mg by mouth daily.  PROAIR HFA 90 mcg/actuation inhaler     cetirizine (ZYRTEC) 10 mg tablet Take 10 mg by mouth nightly.  montelukast (SINGULAIR) 10 mg tablet Take 10 mg by mouth daily.  ranitidine (ZANTAC) 150 mg tablet Take 150 mg by mouth two (2) times a day.  chlorthalidone (HYGROTEN) 25 mg tablet Take 25 mg by mouth daily.  ibuprofen (MOTRIN IB) 200 mg tablet Take  by mouth.  metFORMIN (GLUCOPHAGE) 500 mg tablet Take 500 mg by mouth two (2) times daily (with meals). No current facility-administered medications for this visit.          PHYSICAL EXAMINATION:  Visit Vitals  BP (!) 175/97 (BP 1 Location: Right arm)   Pulse 85   Temp 97.7 °F (36.5 °C) (Temporal)   Resp 16   Ht 5' 7\" (1.702 m)   Wt 309 lb 9.6 oz (140.4 kg)   SpO2 96%   BMI 48.49 kg/m²        ORTHO EXAMINATION:  Examination Left knee Right knee   Skin Intact Intact   Range of motion 95-5 95-5   Effusion + -   Medial joint line tenderness + +   Lateral joint line tenderness - -   Popliteal tenderness - -   Osteophytes palpable + +   Tommys - -   Patella crepitus + +   Anterior drawer - -   Lateral laxity - -   Medial laxity - -   Varus deformity - -   Valgus deformity - -   Pretibial edema - -   Calf tenderness - -     Examination Lumbar Thoracic   Skin Intact Intact   Tenderness + -   Tightness - -   Lordosis Normal N/A   Kyphosis N/A Normal   Scoliosis - -   Flexion Fingertips to knees N/A   Extension 10 N/A   Knee reflexes Normal N/A   Ankle reflexes Normal N/A   Straight leg raise - N/A   Calf tenderness - N/A     TIME OUT:  Chart reviewed for the following:   I, Hina Araujo MD, have reviewed the History, Physical and updated the Allergic reactions for [address] performed immediately prior to start of procedure:  Dunia Liang MD, have performed the following reviews on Mariely Majano prior to the start of the procedure:          * Patient was identified by name and date of birth   * Agreement on procedure being performed was verified  * Risks and Benefits explained to the patient  * Procedure site verified and marked as necessary  * Patient was positioned for comfort  * Consent was obtained     Time: 8:44 AM     Date of procedure: 1/22/2021  Procedure performed by:  Hina Araujo MD  Ms. Jessica Mantilla tolerated the procedure well with no complications. RADIOGRAPHS:  XR MALDONADO KNEE 12/14/18 RODNEY  IMPRESSION:  Three views - No fractures, no effusion, severe medial joint space narrowing, + osteophytes present. IKDC Grade D    XR KNEE RIGHT 1/16/15  IMPRESSION:  1. DJD most pronounced medially and patellofemoral joint with large joint effusion. 2. No acute osseous finding. XR LEFT KNEE 11/21/12  IMPRESSION: No acute osseous abnormality. Tricompartmental degenerative  changes. Joint effusion, suspicious for internal derangement. IMPRESSION:      ICD-10-CM ICD-9-CM    1. Primary osteoarthritis of left knee  M17.12 715.16 sodium hyaluronate (SUPARTZ FX/EUFLEXXA/HYALGAN) 10 mg/mL injection syrg 20 mg      DRAIN/INJECT LARGE JOINT/BURSA   2. Primary osteoarthritis of right knee  M17.11 715.16 sodium hyaluronate (SUPARTZ FX/EUFLEXXA/HYALGAN) 10 mg/mL injection syrg 20 mg      DRAIN/INJECT LARGE JOINT/BURSA   3. Chronic pain of left knee  M25.562 719.46     G89.29 338.29    4.  Chronic pain of right knee  M25.561 719.46 G89.29 338.29    5. Chronic low back pain without sciatica, unspecified back pain laterality  M54.5 724.2     G89.29 338.29      PLAN: After discussing treatment options, patient's knees were injected with 2 cc Euflexxa. There is no need for surgery at this time. She will follow up in 1 week for Euflexxa #2.     Scribed by MD Rashida ObrienHighland Ridge Hospital) as dictated by Jasmina Norman MD

## 2021-01-29 ENCOUNTER — OFFICE VISIT (OUTPATIENT)
Dept: ORTHOPEDIC SURGERY | Age: 61
End: 2021-01-29
Payer: MEDICAID

## 2021-01-29 VITALS
WEIGHT: 293 LBS | TEMPERATURE: 97.7 F | OXYGEN SATURATION: 97 % | HEART RATE: 85 BPM | DIASTOLIC BLOOD PRESSURE: 38 MMHG | HEIGHT: 67 IN | RESPIRATION RATE: 16 BRPM | BODY MASS INDEX: 45.99 KG/M2 | SYSTOLIC BLOOD PRESSURE: 148 MMHG

## 2021-01-29 DIAGNOSIS — G89.29 CHRONIC PAIN OF LEFT KNEE: ICD-10-CM

## 2021-01-29 DIAGNOSIS — G89.29 CHRONIC PAIN OF RIGHT KNEE: ICD-10-CM

## 2021-01-29 DIAGNOSIS — M25.562 CHRONIC PAIN OF LEFT KNEE: ICD-10-CM

## 2021-01-29 DIAGNOSIS — M25.561 CHRONIC PAIN OF RIGHT KNEE: ICD-10-CM

## 2021-01-29 DIAGNOSIS — M17.11 PRIMARY OSTEOARTHRITIS OF RIGHT KNEE: ICD-10-CM

## 2021-01-29 DIAGNOSIS — M17.12 PRIMARY OSTEOARTHRITIS OF LEFT KNEE: Primary | ICD-10-CM

## 2021-01-29 PROCEDURE — 20610 DRAIN/INJ JOINT/BURSA W/O US: CPT | Performed by: SPECIALIST

## 2021-01-29 NOTE — PROGRESS NOTES
Patient: Osvaldo Goodman                MRN: [id number]       SSN: [id number]  YOB: 1960        AGE: 56 y.o. SEX: female  Body mass index is 48.08 kg/m². PCP: Jenna Cisneros MD  01/29/21    Chief Complaint   Patient presents with    Knee Pain     bilateral knee pain     HISTORY:  Osvaldo Goodman is a 56 y.o. female who is seen for bilateral knee pain. ICD-10-CM ICD-9-CM    1. Primary osteoarthritis of left knee  M17.12 715.16 sodium hyaluronate (SUPARTZ FX/EUFLEXXA/HYALGAN) 10 mg/mL injection syrg 20 mg      DRAIN/INJECT LARGE JOINT/BURSA   2. Chronic pain of left knee  M25.562 719.46 sodium hyaluronate (SUPARTZ FX/EUFLEXXA/HYALGAN) 10 mg/mL injection syrg 20 mg    G89.29 338.29 DRAIN/INJECT LARGE JOINT/BURSA   3. Primary osteoarthritis of right knee  M17.11 715.16 sodium hyaluronate (SUPARTZ FX/EUFLEXXA/HYALGAN) 10 mg/mL injection syrg 20 mg      DRAIN/INJECT LARGE JOINT/BURSA   4. Chronic pain of right knee  M25.561 719.46 sodium hyaluronate (SUPARTZ FX/EUFLEXXA/HYALGAN) 10 mg/mL injection syrg 20 mg    G89.29 338.29 DRAIN/INJECT LARGE JOINT/BURSA       Chart reviewed for the following:   Filemon Ramos MD, have reviewed the History, Physical and updated the Allergic reactions for [address] performed immediately prior to start of procedure:  Filemon Ramos MD, have performed the following reviews on Osvaldo Goodman prior to the start of the procedure:            * Patient was identified by name and date of birth   * Agreement on procedure being performed was verified  * Risks and Benefits explained to the patient  * Procedure site verified and marked as necessary  * Patient was positioned for comfort  * Consent was obtained     Time: 8:38 AM     Date of procedure: 1/29/2021    Procedure performed by:  Micah Good MD    Ms. Luis Alberto James tolerated the procedure well with no complications.     PLAN:  After discussing treatment options, patient's knees were injected with 2 cc of Euflexxa.  Ms. Pearce will follow up in one week to complete her visco supplementation injection series.      Scribed by Modesta ReneAtl[facility] Institute) as dictated by Saeed Fatima MD

## 2021-02-04 ENCOUNTER — OFFICE VISIT (OUTPATIENT)
Dept: ORTHOPEDIC SURGERY | Age: 61
End: 2021-02-04
Payer: MEDICAID

## 2021-02-04 VITALS
HEART RATE: 85 BPM | OXYGEN SATURATION: 98 % | BODY MASS INDEX: 45.99 KG/M2 | WEIGHT: 293 LBS | HEIGHT: 67 IN | DIASTOLIC BLOOD PRESSURE: 75 MMHG | SYSTOLIC BLOOD PRESSURE: 146 MMHG | TEMPERATURE: 98.6 F | RESPIRATION RATE: 16 BRPM

## 2021-02-04 DIAGNOSIS — M25.562 CHRONIC PAIN OF LEFT KNEE: ICD-10-CM

## 2021-02-04 DIAGNOSIS — M25.561 CHRONIC PAIN OF RIGHT KNEE: ICD-10-CM

## 2021-02-04 DIAGNOSIS — G89.29 CHRONIC PAIN OF RIGHT KNEE: ICD-10-CM

## 2021-02-04 DIAGNOSIS — G89.29 CHRONIC PAIN OF LEFT KNEE: ICD-10-CM

## 2021-02-04 DIAGNOSIS — M17.11 PRIMARY OSTEOARTHRITIS OF RIGHT KNEE: ICD-10-CM

## 2021-02-04 DIAGNOSIS — M17.12 PRIMARY OSTEOARTHRITIS OF LEFT KNEE: Primary | ICD-10-CM

## 2021-02-04 PROCEDURE — 20610 DRAIN/INJ JOINT/BURSA W/O US: CPT | Performed by: SPECIALIST

## 2021-02-04 NOTE — PROGRESS NOTES
Patient: Jonathan Manjarrez                MRN: [id number]       SSN: [id number]  YOB: 1960        AGE: 56 y.o. SEX: female  Body mass index is 48.08 kg/m². PCP: Hina Fiore MD  02/04/21    Chief Complaint   Patient presents with    Knee Pain     bilateral knee pain     HISTORY:  Jonathan Manjarrez is a 56 y.o. female who is seen for bilateral knee pain. TIME OUT performed immediately prior to start of procedure:  Jessica Vance MD, have performed the following reviews on Jonathan Manjarrez prior to the start of the procedure:            * Patient was identified by name and date of birth   * Agreement on procedure being performed was verified  * Risks and Benefits explained to the patient  * Procedure site verified and marked as necessary  * Patient was positioned for comfort  * Consent was obtained     Time: 8:36 AM     Date of procedure: 2/4/2021    Procedure performed by:  Thong Greene MD    Ms. Anna Colón tolerated the procedure well with no complications      [id number] ICD-9-CM    1. Primary osteoarthritis of left knee  M17.12 715.16 sodium hyaluronate (SUPARTZ FX/EUFLEXXA/HYALGAN) 10 mg/mL injection syrg 20 mg      DRAIN/INJECT LARGE JOINT/BURSA   2. Chronic pain of left knee  M25.562 719.46 sodium hyaluronate (SUPARTZ FX/EUFLEXXA/HYALGAN) 10 mg/mL injection syrg 20 mg    G89.29 338.29 DRAIN/INJECT LARGE JOINT/BURSA   3. Primary osteoarthritis of right knee  M17.11 715.16 sodium hyaluronate (SUPARTZ FX/EUFLEXXA/HYALGAN) 10 mg/mL injection syrg 20 mg      DRAIN/INJECT LARGE JOINT/BURSA   4. Chronic pain of right knee  M25.561 719.46 sodium hyaluronate (SUPARTZ FX/EUFLEXXA/HYALGAN) 10 mg/mL injection syrg 20 mg    G89.29 338.29 DRAIN/INJECT LARGE JOINT/BURSA     PLAN:  After discussing treatment options, patient's knees were injected with 2 cc of Euflexxa. Ms. Anna Colón will follow up PRN now that she has completed her visco supplementation injection series.       Scribed by John Raines ([address]) as dictated by Joaquin Resendiz MD

## 2021-04-22 ENCOUNTER — OFFICE VISIT (OUTPATIENT)
Dept: ORTHOPEDIC SURGERY | Age: 61
End: 2021-04-22
Payer: MEDICAID

## 2021-04-22 VITALS
OXYGEN SATURATION: 98 % | TEMPERATURE: 97.3 F | WEIGHT: 293 LBS | HEIGHT: 67 IN | RESPIRATION RATE: 16 BRPM | BODY MASS INDEX: 45.99 KG/M2 | HEART RATE: 84 BPM

## 2021-04-22 DIAGNOSIS — M17.12 PRIMARY OSTEOARTHRITIS OF LEFT KNEE: Primary | ICD-10-CM

## 2021-04-22 DIAGNOSIS — M79.641 RIGHT HAND PAIN: ICD-10-CM

## 2021-04-22 DIAGNOSIS — G89.29 CHRONIC PAIN OF RIGHT KNEE: ICD-10-CM

## 2021-04-22 DIAGNOSIS — G89.29 CHRONIC PAIN OF LEFT KNEE: ICD-10-CM

## 2021-04-22 DIAGNOSIS — M19.041 OSTEOARTHRITIS OF RIGHT HAND, UNSPECIFIED OSTEOARTHRITIS TYPE: ICD-10-CM

## 2021-04-22 DIAGNOSIS — M25.561 CHRONIC PAIN OF RIGHT KNEE: ICD-10-CM

## 2021-04-22 DIAGNOSIS — M25.562 CHRONIC PAIN OF LEFT KNEE: ICD-10-CM

## 2021-04-22 DIAGNOSIS — M17.11 PRIMARY OSTEOARTHRITIS OF RIGHT KNEE: ICD-10-CM

## 2021-04-22 PROCEDURE — 99213 OFFICE O/P EST LOW 20 MIN: CPT | Performed by: SPECIALIST

## 2021-04-22 NOTE — PROGRESS NOTES
Patient: Suhail Dorman                MRN: [id number]       SSN: [id number]  YOB: 1960        AGE: 56 y.o. SEX: female      PCP: Kassidy Mattson MD  04/22/21    Chief Complaint   Patient presents with    Knee Pain     percy knee pain      HISTORY:  Suhail Dorman is a 56 y.o. female who is seen for bilateral knee pain L>R. She states that she her knee pain increased after receiving her Covid-19 vaccine. She reports occasional swelling in her knees. She feels as though her left knee is going to give out on her. She has pain with walking. She feels a very sharp pain in her knees. She feels grinding and popping sensations. She reports relief from aquatic physical therapy & Euflexxa series completed in April 2016, 2/28/19 and 2/4/21. Previous cortisone injections did not help as much as she would have liked. She is also seen for right index finger pain. Ms. Ekta Gross reports that in the morning her index finger gets trapped in flexion and she has to snap it into extension with her other hand. She thinks she has arthritis in her fingers. She was previously seen for back pain and degenerative scoliosis. She was previously seen by Dr. Kamila Ceron at the spine center. She is still in pain management at [facility]. Pain Assessment  4/22/2021   Location of Pain Knee   Location Modifiers Right;Left   Severity of Pain 8   Quality of Pain (No Data)   Quality of Pain Comment just pain   Duration of Pain Persistent   Frequency of Pain Constant   Aggravating Factors Standing;Walking   Aggravating Factors Comment -   Limiting Behavior -   Relieving Factors (No Data)   Relieving Factors Comment in  the bed   Result of Injury No     Occupation, etc: Ms. Ekta Gross receives Social Security Disability Benefits for hypertension and diabetes. She quit working for the [employer] crew as a  for [address] seven years ago after her work injury. Her grand daughter drives her around since she does not drive.  She lives alone in [place]. She has 2 children with several grandchildren in the area. She lost 10 pounds by cutting out fried foods and sodas. Current weight is 312 pounds. Current height is 5'7\". She is an insulin controlled diabetic. She states her most recent A1c was 9. She has been vaccinated for Covid-19. Last 3 Recorded Weights in this Encounter    04/22/21 0815   Weight: 312 lb 6.4 oz (141.7 kg)     Body mass index is 48.93 kg/m². 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 from DM M79.2    Obesity, morbid ([facility] [address] E66.01       REVIEW OF SYSTEMS: All Below are Negative except: See HPI     Constitutional: negative for fever, chills, and weight loss. Cardiovascular: negative for chest pain, claudication, leg swelling, SOB, PAGE   Gastrointestinal: Negative for pain, N/V/C/D, Blood in stool or urine, dysuria,  hematuria, incontinence, pelvic pain. Musculoskeletal: See HPI   Neurological: Negative for dizziness and weakness. Negative for headaches, Visual changes, confusion, seizures   Phychiatric/Behavioral: Negative for depression, memory loss, substance  abuse. Extremities: Negative for hair changes, rash, or skin lesion changes. Hematologic: Negative for bleeding problems, bruising, pallor or swollen lymph  nodes   Peripheral Vascular: No calf pain, no circulation deficits.     Social History     Socioeconomic History    Marital status:      Spouse name: Not on file    Number of children: Not on file    Years of education: Not on file    Highest education level: Not on file   Occupational History    Not on file   Social Needs    Financial resource strain: Not on file    Food insecurity     Worry: Not on file     Inability: Not on file    Transportation needs     Medical: Not on file     Non-medical: Not on file   Tobacco Use    Smoking status: Former Smoker    Smokeless tobacco: Never Used   Substance and Sexual Activity    Alcohol use: No    Drug use: No    Sexual activity: Not Currently   Lifestyle    Physical activity     Days per week: Not on file     Minutes per session: Not on file    Stress: Not on file   Relationships    Social connections     Talks on phone: Not on file     Gets together: Not on file     Attends Restorationist service: Not on file     Active member of club or organization: Not on file     Attends meetings of clubs or organizations: Not on file     Relationship status: Not on file    Intimate partner violence     Fear of current or ex partner: Not on file     Emotionally abused: Not on file     Physically abused: Not on file     Forced sexual activity: Not on file   Other Topics Concern    Not on file   Social History Narrative    ** Merged History Encounter **             Allergies   Allergen Reactions    Aspirin Nausea and Vomiting     itching    Vicodin [Hydrocodone-Acetaminophen] Itching        Current Outpatient Medications   Medication Sig    gabapentin (NEURONTIN) 300 mg capsule Take 1 Cap by mouth three (3) times daily. Indications: NEUROPATHIC PAIN    diclofenac EC (VOLTAREN) 75 mg EC tablet take 1 tablet by mouth twice a day    oxyCODONE-acetaminophen (PERCOCET) 7.5-325 mg per tablet Take 1 tab PO every day to TID as needed for pain. DO NOT FILL:5/30/17    amLODIPine (NORVASC) 5 mg tablet 5 mg.  aspirin delayed-release 81 mg tablet 81 mg.    glipiZIDE SR (GLUCOTROL XL) 2.5 mg CR tablet 2.5 mg.    hydroCHLOROthiazide (HYDRODIURIL) 25 mg tablet 25 mg.     pravastatin (PRAVACHOL) 20 mg tablet 40 mg.    BD INSULIN PEN NEEDLE UF MINI 31 gauge x 3/16\" ndle     albuterol (PROVENTIL VENTOLIN) 2.5 mg /3 mL (0.083 %) nebulizer solution     LANTUS SOLOSTAR 100 unit/mL (3 mL) pen inject 60 units subcutaneously at bedtime    MUCINEX 600 mg ER tablet     BYETTA 5 mcg/dose (250 mcg/mL) 1.2 mL injection     CONTOUR METER misc use as directed    BD INSULIN SYRINGE ULTRA-FINE 0.5 mL 31 gauge x 5/16 syrg USE THREE TIMES DAILY AS DIRECTED    BD INSULIN PEN NEEDLE UF ORIG 29 gauge x 1/2\" ndle USE 4 TIMES DAILY    ASCENSIA CONTOUR strip USE 1 STRIP 3 TIMES DAILY    fluticasone (FLONASE) 50 mcg/actuation nasal spray instill 1 spray into each nostril twice a day    polyethylene glycol (MIRALAX) 17 gram/dose powder MIX 17 GM IN  8 OZ OF LIQUID AND DRINL 1 TO 2 TIMES DAILY FOR CONSTIPATION    NOVOLOG FLEXPEN 100 unit/mL inpn     metoprolol succinate (TOPROL-XL) 25 mg XL tablet     ADVAIR DISKUS 250-50 mcg/dose diskus inhaler     atorvastatin (LIPITOR) 20 mg tablet Take 20 mg by mouth daily.  PROAIR HFA 90 mcg/actuation inhaler     cetirizine (ZYRTEC) 10 mg tablet Take 10 mg by mouth nightly.  montelukast (SINGULAIR) 10 mg tablet Take 10 mg by mouth daily.  ranitidine (ZANTAC) 150 mg tablet Take 150 mg by mouth two (2) times a day.  chlorthalidone (HYGROTEN) 25 mg tablet Take 25 mg by mouth daily.  ibuprofen (MOTRIN IB) 200 mg tablet Take  by mouth.  metFORMIN (GLUCOPHAGE) 500 mg tablet Take 500 mg by mouth two (2) times daily (with meals). No current facility-administered medications for this visit.          PHYSICAL EXAMINATION:  Visit Vitals  Pulse 84   Temp 97.3 °F (36.3 °C) (Temporal)   Resp 16   Ht 5' 7\" (1.702 m)   Wt 312 lb 6.4 oz (141.7 kg)   SpO2 98%   BMI 48.93 kg/m²        ORTHO EXAMINATION:  Examination Left knee Right knee   Skin Intact Intact   Range of motion 95-5 95-5   Effusion + -   Medial joint line tenderness + +   Lateral joint line tenderness - -   Popliteal tenderness - - Osteophytes palpable + +   Tommys - -   Patella crepitus + +   Anterior drawer - -   Lateral laxity - -   Medial laxity - -   Varus deformity - -   Valgus deformity - -   Pretibial edema - -   Calf tenderness - -       RADIOGRAPHS:  XR MALDONADO KNEE 12/14/18 RODNEY  IMPRESSION:  Three views - No fractures, no effusion, severe medial joint space narrowing, + osteophytes present. IKDC Grade D    XR KNEE RIGHT 1/16/15  IMPRESSION:  1. DJD most pronounced medially and patellofemoral joint with large joint effusion. 2. No acute osseous finding. XR LEFT KNEE 11/21/12  IMPRESSION: No acute osseous abnormality. Tricompartmental degenerative  changes. Joint effusion, suspicious for internal derangement. IMPRESSION:      ICD-10-CM ICD-9-CM    1. Primary osteoarthritis of left knee  M17.12 715.16 REFERRAL TO PHYSICAL THERAPY   2. Chronic pain of left knee  M25.562 719.46 REFERRAL TO PHYSICAL THERAPY    G89.29 338.29    3. Primary osteoarthritis of right knee  M17.11 715.16 REFERRAL TO PHYSICAL THERAPY   4. Chronic pain of right knee  M25.561 719.46 REFERRAL TO PHYSICAL THERAPY    G89.29 338.29      PLAN: She will be referred for bariatric surgery consultation. She will start a brief course of outpatient physical therapy. We discussed possible need for left knee arthroplasty at some time in the future pending weight loss. She will follow up as needed.     Scribed by Joo Melo ([address]) as dictated by Vimal Mendez MD

## 2021-05-04 ENCOUNTER — HOSPITAL ENCOUNTER (OUTPATIENT)
Dept: PHYSICAL THERAPY | Age: 61
Discharge: HOME | End: 2021-05-04
Payer: MEDICAID

## 2021-05-04 PROBLEM — M79.641 RIGHT HAND PAIN: Status: ACTIVE | Noted: 2021-05-04

## 2021-05-04 PROCEDURE — 97162 PT EVAL MOD COMPLEX 30 MIN: CPT

## 2021-05-04 NOTE — PROGRESS NOTES
PHYSICAL THERAPY - DAILY TREATMENT NOTE    Patient Name: Jagruti Wheeler        Date: 2021  : 1960   YES Patient  Verified  Visit #:   1   of   8  Insurance: Payor: [facility] MEDICAID / Plan: [facility] [employer] ELITE PLUS / Product Type: Managed Care Medicaid /      In time: 9:30 Out time: 10:00   Total Treatment Time: 30     Medicare/BCBS [place] Time Tracking (below)   Total Timed Codes (min):  0 1:1 Treatment Time:  0     TREATMENT AREA = B knee pain     SUBJECTIVE    Pain Level (on 0 to 10 scale):  8  / 10   Medication Changes/New allergies or changes in medical history, any new surgeries or procedures? NO    If yes, update Summary List   Subjective Functional Status/Changes:  []  No changes reported   \"Since I had that COVID shot by legs have bee worse\"   Knee pain 10-15 years,   Also Hx of LBP with tingling into LEs  Right knee > Left Anterior knee pain  Max 10/10, Min 6/10  Gel injections in knee in the past helped for about a year  Has a SPC and rollator, use SPC   Limited in amb tolerance, Max 5 mins. Most recent fall 2-3 months ago, knee gave out and fell out of the chair  Challenged with stepping over into shower (bathtub), standing from low chair. Sits to get dressed  Goal: BE able to walk some and stand to wash dishes       OBJECTIVE     min Patient Education:  Brayan Montesinos   []  Progressed/Changed HEP based on:   Issue HEP NV     Other Objective/Functional Measures:    Sit<>stand with B UE support  Amb with bilateral trendelunerg, requires standing rest x2 during 100foot amb  Hip Flex Right 3+/5, Left 4/5  Knee Ext 3-5 BLE  Knee flex 4-/5 BLE  DF 4+/5  Hip abd in stand 2/5 BLE  Stairs-unable  Declined lying down due to LBP     Post Treatment Pain Level (on 0 to 10) scale:   8  / 10     ASSESSMENT    Assessment/Changes in Function:     Justification for Eval Code Complexity: Moderate  Patient History (low 0, mod 1-2, high 3-4):  High   Examination (low 1-2, mod 3+, high 4+): High   Clinical Presentation (low: stable/uncomplicated; mod: evolving; high: unstable/unpredictable): Moderate  Clinical Decision Making (low , mod 26-74, high 1-25): Moderate         []  See Progress Note/Recertification   Patient will continue to benefit from skilled PT services to analyze,, cue,, progress,, modify,, demonstrate,, instruct, and address, movement patterns,, therapeutic interventions,, postural abnormalities,, soft tissue restrictions,, ROM,, strength,, functional mobility,, body mechanics/ergonomics, and home and community integration, to attain remaining goals. Progress toward goals / Updated goals:    Goals set per POC     PLAN    []  Upgrade activities as tolerated YES Continue plan of care   []  Discharge due to :    []  Other:      Therapist: Rhys Ling DPT    Date: 5/4/2021 Time: 9:32 AM   No future appointments.

## 2021-05-04 NOTE — PROGRESS NOTES
[facility] PHYSICAL THERAPY  [address], [place], [address] - Phone: [phone]  Fax: [phone] / [address] [facility]  Patient Name: Nai Esposito : 1960   Medical   Diagnosis: Left knee pain [M25.562]  Right knee pain [M25.561] Treatment Diagnosis: Bilateral knee pain   Onset Date: Chronic with recent exascerbation     Referral Source: Rhea Bergeron MD [facility]): 2021   Prior Hospitalization: See medical history Provider #: [id number]   Prior Level of Function: Sedentary, interm amb with SPC   Comorbidities: Obese, HTN, DM, Sleep Apnea (does not wear C-pap), LBP   Medications: Verified on Patient Summary List   The Plan of Care and following information is based on the information from the initial evaluation.   ==========================================================================================  Assessment / key information:  Pt is a 56year old female who presents to PT today with c/o recent increase in chronic bilateral knee pain. Pt attempted PT last year for bilateral knee pain however due to the pandemic did not continue. She reports she feels as if her knee pan recently worsened following the second  COVID vaccine. S/S are consistent today with generalized deconditioning and pain likely related to OA. He max pain level reached 10/10 and min pain level reaches 6/10. She reports her day is mostly spent sedentary, also related to chronic LBP, and is limited to ~5 mins max amb tolerance. She is challenged with home negt including standing to wash dishes, getting up from a low chair, and getting in and out of the shower. She reports if she approached stairs in the community and no ramp is available, she will just leave. Pt would benefit from skilled PT to address increased pain, decreased strength and ROM, impaired gait and balance, as well as limited activity tolerance. Obj:  Sit<>stand with B UE support  Amb with bilateral [place]unburg, requires standing rest x2 during 100foot amb  MMT:  Hip Flex Right 3+/5, Left 4/5  Knee Ext 3-5 BLE  Knee flex 4-/5 BLE  DF 4+/5  Hip abd in stand 2/5 BLE  Stairs-unable  Declined lying down due to LBP  FOTO score 28 indicating 72% functional disability   ==========================================================================================  Eval Complexity: History: HIGH Complexity :3+ comorbidities / personal factors will impact the outcome/ POC Exam:HIGH Complexity : 4+ Standardized tests and measures addressing body structure, function, activity limitation and / or participation in recreation  Presentation: MEDIUM Complexity : Evolving with changing characteristics  Clinical Decision Making:MEDIUM Complexity : FOTO score of 26-74Overall Complexity:MEDIUM    Problem List: pain affecting function, decrease ROM, decrease strength, edema affecting function, impaired gait/ balance, decrease ADL/ functional abilitiies, decrease activity tolerance, decrease flexibility/ joint mobility and decrease transfer abilities   Treatment Plan may include any combination of the following: Therapeutic exercise, Therapeutic activities, Neuromuscular re-education, Physical agent/modality, Gait/balance training, Manual therapy, Patient education, Self Care training, Functional mobility training, Home safety training and Stair training  Patient / Family readiness to learn indicated by: asking questions, trying to perform skills and interest  Persons(s) to be included in education: patient (P)  Barriers to Learning/Limitations: None  Measures taken: NA   Patient Goal (s): \"Be able to walk some and stand to wash dishes. \"   Patient self reported health status: fair  Rehabilitation Potential: good   Short Term Goals: To be accomplished in  2  weeks:  1. Pt will be compliant with HEP for symptom management at home.   2. Pt will demo sit>stand without UE assist for ease at home. 3. Pt will amb 250feet without rest break for ease negt in community.  Long Term Goals: To be accomplished in  4  weeks:  1. Pt will be independent with HEP at D/C for self management. 2. Pt will demo lateral romana stepover without safety concerns for ease getting into bathtub. 3. Pt will demo ability to step up onto 6in step for ease in community. 4. Pt will increase FS FOTO Score to >/= 38 to indicate a significant decrease in functional disability. Frequency / Duration:   Patient to be seen  2  times per week for 4  weeks:  Patient / Caregiver education and instruction: self care, activity modification and exercises    Therapist Signature: Tracy Larson DPT Date: 6/1/6580   Certification Period: NA Time: 10:02 AM   ===========================================================================================  I certify that the above Physical Therapy Services are being furnished while the patient is under my care. I agree with the treatment plan and certify that this therapy is necessary. Physician Signature:        Date:       Time:     Please sign and return to [facility] or you may fax the signed copy to [phone]. Thank you.

## 2021-05-11 ENCOUNTER — HOSPITAL ENCOUNTER (OUTPATIENT)
Dept: PHYSICAL THERAPY | Age: 61
Discharge: HOME | End: 2021-05-11
Payer: MEDICAID

## 2021-05-11 PROCEDURE — 97112 NEUROMUSCULAR REEDUCATION: CPT

## 2021-05-11 PROCEDURE — 97110 THERAPEUTIC EXERCISES: CPT

## 2021-05-11 NOTE — PROGRESS NOTES
PHYSICAL THERAPY - DAILY TREATMENT NOTE    Patient Name: Cristiano Marte        Date: 2021  : 1960   YES Patient  Verified  Visit #:   2   of   8  Insurance: Payor: [facility] MEDICAID / Plan: [facility] [employer] ELITE PLUS / Product Type: Managed Care Medicaid /      In time: 035 Out time: 506   Total Treatment Time: 32     TREATMENT AREA =  Left knee pain [M25.562]  Right knee pain [M25.561]    SUBJECTIVE    Pain Level (on 0 to 10 scale):  8  / 10   Medication Changes/New allergies or changes in medical history, any new surgeries or procedures? NO    If yes, update Summary List   Subjective Functional Status/Changes:  []  No changes reported     Pt requests to no lay down today secondary to pain,         OBJECTIVE  Therapeutic Procedures:  Min Procedure Specifics + Rationale   (24) [x] Therapeutic Exercise    See Flowsheet   Rationale: increase ROM and increase strength to improve the patients ability to participate in ADL's      8 [x] Neuromuscular Re-Education See Flow Sheet  Relative SLS Hip 3-Way  Rationale: To improve stability over various surfaces to decrease falls risk        min Patient Education:  YES Reviewed HEP         Other Objective/Functional Measures:    See flowsheet for more details    Pt was fatigues quickly after 3 of each hip 3 way. Mod/max VC and demos required throughout session for proper form and increased safety         Post Treatment Pain Level (on 0 to 10) scale:   8  / 10     ASSESSMENT    Assessment/Changes in Function:     Decreased activity tolerance and effort.  Poor tolerance to movement secondary to fatigue vs. pain     []  See Progress Note/Recertification   Patient will continue to benefit from skilled PT services to analyze,, cue,, progress,, modify,, demonstrate,, instruct, and address, movement patterns,, therapeutic interventions,, postural abnormalities,, soft tissue restrictions,, ROM,, strength,, functional mobility,, body mechanics/ergonomics, and home and community integration, to attain remaining goals.    Progress toward goals / Updated goals:    1st session since initial evaluation, no notable progress yet     PLAN    [x]  Upgrade activities as tolerated YES Continue plan of care   []  Discharge due to :    []  Other:      Therapist: Maximiliano Ramirez DPT    Date: 5/11/2021 Time: 8:55 AM     Future Appointments   Date Time Provider Ra Freedman   5/14/2021  8:45 AM [facility]   5/18/2021  8:45 AM [facility]   5/20/2021  8:45 AM Jinnie Reining [facility]   5/25/2021  8:00 AM Jinnie Reining [facility]   5/27/2021  8:00 AM [address] [facility]

## 2021-05-13 ENCOUNTER — APPOINTMENT (OUTPATIENT)
Dept: PHYSICAL THERAPY | Age: 61
End: 2021-05-13
Payer: MEDICAID

## 2021-05-14 ENCOUNTER — HOSPITAL ENCOUNTER (OUTPATIENT)
Dept: PHYSICAL THERAPY | Age: 61
End: 2021-05-14
Payer: MEDICAID

## 2021-05-18 ENCOUNTER — HOSPITAL ENCOUNTER (OUTPATIENT)
Dept: PHYSICAL THERAPY | Age: 61
Discharge: HOME | End: 2021-05-18
Payer: MEDICAID

## 2021-05-18 PROCEDURE — 97110 THERAPEUTIC EXERCISES: CPT

## 2021-05-18 PROCEDURE — 97530 THERAPEUTIC ACTIVITIES: CPT

## 2021-05-18 NOTE — PROGRESS NOTES
PHYSICAL THERAPY - DAILY TREATMENT NOTE    Patient Name: Ariana Ortiz        Date: 2021  : 1960   YES Patient  Verified  Visit #:   3   of   8  Insurance: Payor: [facility] MEDICAID / Plan: [facility] [employer] ELITE PLUS / Product Type: Managed Care Medicaid /      In time: 8:44 Out time: 9:17   Total Treatment Time: 33     Medicare/BCBS [place] Time Tracking (below)   Total Timed Codes (min):  na 1:1 Treatment Time:  na     TREATMENT AREA =  Left knee pain [M25.562]  Right knee pain [M25.561]    SUBJECTIVE    Pain Level (on 0 to 10 scale):  8  / 10   Medication Changes/New allergies or changes in medical history, any new surgeries or procedures? NO    If yes, update Summary List   Subjective Functional Status/Changes:  []  No changes reported     Pt states she was \"sore all over\" after starting the exercises last session, reports her knees are sore today from doing the exercises at home. \"When I leave here I'm Mark Harrisonon go home and get in my  bed. \"  Pt reports she lays down in her bed frequently to relieve her pain. OBJECTIVE    23 min Therapeutic Exercise:  [x]  See flow sheet   Rationale:    Increase LE ROM/flexibility, increase strength and increase proprioception to improve the patients ability to perform ADL's and gait safely and I to allow for increased activity tolerance and increased functional mobility. 10 min Therapeutic Activity: see flow sheet    Rationale:   increase ROM, increase strength, improve coordination, improve balance and increase proprioception to promote increased functional mobility and increased activity tolerance with ADL's.      min Patient Education:  YES  Reviewed HEP   []  Progressed/Changed HEP based on:   Encouraged pt to lay down less, sitting for rest instead of laying down. Use of rollator will allow pt to walk longer distances with less strain on B knees and L/S, pt verbalized understanding.       Other Objective/Functional Measures:    Pt ambulating with antalgic gait in clinic. Pt reports increased soreness and only able to tolerate 2' warm-up on NuStep. Pt with c/o LBP>B knee pain with standing therapeutic exercise. Discussed option of rollator to facilitate longer distances for ambulation. Pt has one currently that she was given, is too small for her. She does know she would have an out of pocket cost to receive one through insurance. Pt brought in medication list-see copy in chart. Post Treatment Pain Level (on 0 to 10) scale:   8  / 10     ASSESSMENT    Assessment/Changes in Function:     Pt reporting DOMS with initiation of therapeutic exercise. []  See Progress Note/Recertification   Patient will continue to benefit from skilled PT services to modify and progress therapeutic interventions, address functional mobility deficits, address ROM deficits, address strength deficits, analyze and address soft tissue restrictions, analyze and cue movement patterns, address imbalance/dizziness and instruct in home and community integration to attain remaining goals. Progress toward goals / Updated goals:    1. Pt will be compliant with HEP for symptom management at home. pt reports compliance with HEP issued at  initial evaluation  2. Pt will demo sit>stand without UE assist for ease at home. 3. Pt will amb 250feet without rest break for ease negt in community.      PLAN    []  Upgrade activities as tolerated YES Continue plan of care   []  Discharge due to :    []  Other:      Therapist: Huan Chisholm PTA    Date: 5/18/2021 Time: 8:48 AM     Future Appointments   Date Time Provider Ra Freedman   5/20/2021  8:45 AM [facility]   5/27/2021  8:00 AM Sophia Leonardo [facility]

## 2021-05-20 ENCOUNTER — APPOINTMENT (OUTPATIENT)
Dept: PHYSICAL THERAPY | Age: 61
End: 2021-05-20
Payer: MEDICAID

## 2021-05-25 ENCOUNTER — APPOINTMENT (OUTPATIENT)
Dept: PHYSICAL THERAPY | Age: 61
End: 2021-05-25
Payer: MEDICAID

## 2021-05-27 ENCOUNTER — APPOINTMENT (OUTPATIENT)
Dept: PHYSICAL THERAPY | Age: 61
End: 2021-05-27
Payer: MEDICAID

## 2021-06-01 ENCOUNTER — HOSPITAL ENCOUNTER (OUTPATIENT)
Dept: PHYSICAL THERAPY | Age: 61
Discharge: HOME | End: 2021-06-01
Payer: MEDICAID

## 2021-06-01 PROCEDURE — 97110 THERAPEUTIC EXERCISES: CPT

## 2021-06-01 PROCEDURE — 97530 THERAPEUTIC ACTIVITIES: CPT

## 2021-06-01 PROCEDURE — 97165 OT EVAL LOW COMPLEX 30 MIN: CPT

## 2021-06-01 NOTE — PROGRESS NOTES
PHYSICAL THERAPY - DAILY TREATMENT NOTE    Patient Name: Bentley Morgan        Date: 2021  : 1960   YES Patient  Verified  Visit #:   4   of   8  Insurance: Payor: [facility] MEDICAID / Plan: Leah Salgado / Product Type: Managed Care Medicaid /      In time: 9:33 Out time: 10:05   Total Treatment Time: 32     Medicare/BCBS [place] Time Tracking (below)   Total Timed Codes (min):  na 1:1 Treatment Time:  na     TREATMENT AREA =  Left knee pain [M25.562]  Right knee pain [M25.561]    SUBJECTIVE    Pain Level (on 0 to 10 scale):  -7  / 10   Medication Changes/New allergies or changes in medical history, any new surgeries or procedures? NO    If yes, update Summary List   Subjective Functional Status/Changes:  []  No changes reported     Pt reports she started having a light like a  \"flickering flash light\" in her right eye, she is scheduled to see her eye doctor . Pt also reports intermittent dizziness when she is walking. Pt states she is tired today, [facility] [place] [facility] and didn't get home until 3 AM. Pt reports had to walk up and down stairs while she was there and it hurt more to go up then come down. \"I was going to call and cancel this morning, but I couldn't find my phone. \"    Pt states has a cane and walker, uses the cane sometimes when needed. OBJECTIVE    22 min Therapeutic Exercise:  [x]  See flow sheet   Rationale:     Increase LE ROM/flexibility, increase strength and increase proprioception to improve the patients ability to perform ADL's and gait safely and I to allow for increased activity tolerance and increased functional mobility. 10 min Therapeutic Activity: sit to stand  step ups    Rationale:   improve coordination, improve balance and increase proprioception to improve the patients ability to perform ADLs, transfers and gait safely and independently.       min Patient Education:  YES  Reviewed HEP   []  Progressed/Changed HEP based on:   Advised pt to schedule appointment with her PCP regarding her symptoms of visual changes and dizziness. Other Objective/Functional Measures:    Pt c/o fatigue from recent travel and getting home late. BP taken prior to treatment: 130/90 mmHg    Added sit<>stand for LE strengthening. Pt able to perform sit>stand without UE support from 22\" height with moderate difficulty. Added step ups on 4\" block, pt completed 10 reps right LE, but c/o increased pain after 5 reps left LE. Post Treatment Pain Level (on 0 to 10) scale:   6-7  / 10     ASSESSMENT    Assessment/Changes in Function:     Pt challenged with progression of therapeutic exercise. []  See Progress Note/Recertification   Patient will continue to benefit from skilled PT services to modify and progress therapeutic interventions, address functional mobility deficits, address ROM deficits, address strength deficits, analyze and address soft tissue restrictions, analyze and cue movement patterns, address imbalance/dizziness and instruct in home and community integration to attain remaining goals. Progress toward goals / Updated goals:    1. Pt will be compliant with HEP for symptom management at home. 2. Pt will demo sit>stand without UE assist for ease at home.sit<>stand from elevated mat    3. Pt will amb 250feet without rest break for ease negt in community. · Long Term Goals: To be accomplished in  4  weeks:  1. Pt will be independent with HEP at D/C for self management. 2. Pt will demo lateral romana stepover without safety concerns for ease getting into bathtub. 3. Pt will demo ability to step up onto 6in step for ease in community. step ups on 4\" block this session  4. Pt will increase FS FOTO Score to >/= 38 to indicate a significant decrease in functional disability.       PLAN    []  Upgrade activities as tolerated YES Continue plan of care   []  Discharge due to :    []  Other:      Therapist: Gagandeep Chirinos JOAQUÍN Khalil    Date: 6/1/2021 Time: 9:50 AM     Future Appointments   Date Time Provider Ra Freedman   6/3/2021  8:45 AM Bree Gonsalez [facility]   6/8/2021  8:45 AM Mercedes Jarquin [facility]   6/10/2021  8:45 AM Bree Gonsalez [facility]   6/15/2021  8:45 AM Bree Gonsalez [facility]   6/17/2021  8:45 AM Bree Gonsalez [facility]   6/17/2021  9:30 AM Maine Patel OTR/L [facility]   6/22/2021  8:45 AM Mercedes Jarquin [facility]   6/24/2021  8:45 AM Bree Gonsalez [facility]   6/29/2021  8:45 AM Bela Khalil [facility]

## 2021-06-01 NOTE — PROGRESS NOTES
OCCUPATIONAL THERAPY - DAILY TREATMENT NOTE    Patient Name: Nai Esposito        Date: 2021  : 1960   YES Patient  Verified  Visit #:   1   of   4  Insurance: Payor: [facility] MEDICAID / Plan: [facility] [employer] ELITE PLUS / Product Type: Managed Care Medicaid /      In time: 8:55 Out time: 9:30   Total Treatment Time: 35     TREATMENT AREA =  Right IF    SUBJECTIVE    Pain Level (on 0 to 10 scale):  3  / 10   Medication Changes/New allergies or changes in medical history, any new surgeries or procedures? NO    If yes, update Summary List   Subjective Functional Status/Changes:  []  No changes reported     This finger really hurts. I've been squeezing a ball. OBJECTIVE     min Therapeutic Exercise:  [x]  See flow sheet      min Patient Education:  YES  Reviewed HEP- hand ROM   []  Progressed/Changed HEP based on: Other Objective/Functional Measures:    See eval for details. Right volar MPJ is point tender and note click with digit flexion. Post Treatment Pain Level (on 0 to 10) scale:   0  / 10 rest; 3/10 with flexion      ASSESSMENT  Assessment/Changes in Function:     Suspect trigger finger. OT to provide splint once approved by MD PAYTON Eval Complexity Justification:  Patient History: Low- finger pain   Examination : low- AROM, pain and strength   Clinical Decision Making: low- motivated to resolve pain        [x]  See Progress Note/Recertification   Patient will continue to benefit from skilled OT services to address ROM deficits and pain  to attain remaining goals.    Progress toward goals / Updated goals:    See eval goals      PLAN  [x]  Upgrade activities as tolerated YES Continue plan of care   []  Discharge due to :    [x]  Other: OT for 2-3 visits for goals      Therapist: RICARDO Henson    Date: 2021 Time: 10:59 AM

## 2021-06-01 NOTE — PROGRESS NOTES
[facility] PHYSICAL THERAPY  [address] [facility] [address], [address] - Phone: [phone]  Fax: [phone] / [facility]  Patient Name: Christianne Desai : 1960   Medical   Diagnosis: Right hand weakness [R29.898] Treatment Diagnosis: Right IF pain    Onset Date: 2021     Referral Source: Lucy Gould MD [place] [facility]): 2021   Prior Hospitalization: See medical history Provider #: [id number]   Prior Level of Function: Ind    Comorbidities: na   Medications: Verified on Patient Summary List   The Plan of Care and following information is based on the information from the initial evaluation.   ===========================================================================================  Assessment / key information: Patient is a 55 yo right handed female with right IF pain. AROM elbow to hand is WFL except IF: MP 0-60; PIP 0-85; DIP 0-55. Strength right arm is WFL except right  27# left 43#. Sensation intact. Pain 3/10. No hand edema. She is Ind with ADLs and IADLs, having pain with  tasks. ** Note point tenderness volar right MP IF and click/catch with digit flex/ext. Suspect trigger finger. Patient would benefit from a MP blocking splint. FOTO score: na  ===========================================================================================  Eval Complexity: History: LOW Complexity : Brief history review ; Examination: LOW Complexity : 1-3 performance deficits relating to physical, cognitive , or psychosocial skils that result in activity limitations and / or participation restrictions ;  Decision Making:LOW Complexity : No comorbidities that affect functional and no verbal or physical assistance needed to complete eval tasks   Problem List: Pain effecting function, Decreased range of motion and Decreased strength   Treatment Plan may include any combination of the following: Therapeutic exercise, Splinting/orthoses and Patient education  Patient / Family readiness to learn indicated by: trying to perform skills and interest  Persons(s) to be included in education: patient (P)  Barriers to Learning/Limitations: None  Measures taken: na   Patient Goal (s): Stop finger hurting. Patient self reported health status: fair  Rehabilitation Potential: good   Short Term Goals: To be accomplished in 1 treatments: 1. Ind HEP   Long Term Goals: To be accomplished in 2-4  treatments: 1. Fabricate right MP IF blocking splint for continued wear  2. No pain with ADLs  3.Baseline use right hand with IADLs  Frequency / Duration:   Patient to be seen 2-4  treatments:  Patient / Caregiver education and instruction: exercises    Therapist Signature: RICARDO Figueroa Date: 8/8/1763   Certification Period: na Time: 11:00 AM   ===========================================================================================  I certify that the above Occupational Therapy Services are being furnished while the patient is under my care. I agree with the treatment plan and certify that this therapy is necessary. Physician Signature:        Date:       Time:     Please sign and return to [facility] or you may fax the signed copy to [phone]. Thank you.                                        Moustapha Collins MD

## 2021-06-03 ENCOUNTER — APPOINTMENT (OUTPATIENT)
Dept: PHYSICAL THERAPY | Age: 61
End: 2021-06-03
Payer: MEDICAID

## 2021-06-08 ENCOUNTER — APPOINTMENT (OUTPATIENT)
Dept: PHYSICAL THERAPY | Age: 61
End: 2021-06-08
Payer: MEDICAID

## 2021-06-10 ENCOUNTER — APPOINTMENT (OUTPATIENT)
Dept: PHYSICAL THERAPY | Age: 61
End: 2021-06-10
Payer: MEDICAID

## 2021-06-10 ENCOUNTER — HOSPITAL ENCOUNTER (OUTPATIENT)
Dept: PHYSICAL THERAPY | Age: 61
Discharge: HOME | End: 2021-06-10
Payer: MEDICAID

## 2021-06-10 PROCEDURE — 97530 THERAPEUTIC ACTIVITIES: CPT

## 2021-06-10 PROCEDURE — 97110 THERAPEUTIC EXERCISES: CPT

## 2021-06-10 NOTE — PROGRESS NOTES
[facility] PHYSICAL THERAPY  [address] [facility] [address] [address], [address] - Phone: [phone]  Fax: [phone]  Progress Note/CONTINUED PLAN OF CARE for PHYSICAL THERAPY          Patient Name: Sapphire Topete : 1960   Treatment/Medical Diagnosis: Left knee pain [M25.562]  Right knee pain [M25.561]   Referral Source: Lucio Mark MD Start of Care [facility]): 2021   Prior Hospitalization: See Medical History Provider #:  [id number]   Medications: Verified on Patient Summary List   Visits from [facility]: 4 Missed Visits: 4   SUMMARY OF TREATMENT  Patient's POC has consisted of therex, therapeutic activities, manual therapy prn, modalities prn, pt. education, and a comprehensive HEP. Treatment strategies used to address functional mobility deficits, ROM deficits, strength deficits, analyze and address soft tissue restrictions, analyze and cue movement patterns, analyze and modify body mechanics/ergonomics, assess and modify postural abnormalities and instruct in home and community integration. CURRENT STATUS  Patricia Cavazos reports 40% improvement since start of care and has not been able to attend therapy consistently. Missed appointments are attributed to travel and exacerbation of visual impairments. She reports descending stairs has become easier since start of care. She has met minimal goals secondary to only being able to attend 4 follow up sessions in 5 weeks. Within therapy sessions she demonstrates varied effort while performing exercises. Routinely needing encouragement. Walking tolerance continues to be limited. She was only able to ambulate 90 ft before requiring rest breaks. However FOTO outcomes survey improve from 28/100 to 39/100. GOALS  1. Pt will be compliant with HEP for symptom management at home.   MET: Compliant  2. Pt will demo sit>stand without UE assist for ease at home.   MET: sit<>stand without UE support  3. Pt will amb 250feet without rest break for ease negt in community. NOT MET: 90ft without rest break  4. Pt will be independent with HEP at D/C for self management. NOT MET  5. Pt will demo ability to step up onto 6in step for ease in community. MET: able to with nonreciprocal pattern and BUE support  6. Pt will increase FS FOTO Score to >/= 38 to indicate a significant decrease in functional disability. MET: 39/100    New Goals to be achieved in   4  weeks:  · Short Term Goals: To be accomplished in  2  weeks:  1. Pt will amb 250feet without rest break for ease negt in community. 2. Pt will be independent with HEP at D/C for self management. 3. Pt will demo lateral romana stepover without safety concerns for ease getting into bathtub.     Frequency / Duration:   Patient to be seen   2   times per week for   4    weeks:    RECOMMENDATIONS: Continue and progress functional therex/therapeutic activity as able, utilizing manual therapy and modalities prn. Progress patient towards independent HEP to facilitate self-management of symptoms and progress gains after PT. If you have any questions/comments please contact us directly at [phone]. Thank you for allowing us to assist in the care of your patient. Therapist Signature: Shikha Francisco DPT Date: 4/75/8549   Certification Period:  Reporting Period: NA  NA Time: 9:20 AM   NOTE TO PHYSICIAN:  PLEASE COMPLETE THE ORDERS BELOW AND FAX TO   [facility]: [phone]. If you are unable to process this request in 24 hours please contact our office: [phone].    ___ I have read the above report and request that my patient continue as recommended.   ___ I have read the above report and request that my patient continue therapy with the following changes/special instructions: ________________________________________________   ___ I have read the above report and request that my patient be discharged from therapy.      Physician Signature:        Date:       Time:    Kushal Donis Pérez MD

## 2021-06-10 NOTE — PROGRESS NOTES
PHYSICAL THERAPY - DAILY TREATMENT NOTE    Patient Name: Pat Cedeno        Date: 6/10/2021  : 1960   YES Patient  Verified  Visit #:   5   of   8  Insurance: Payor: [facility] MEDICAID / Plan: Jorge Rehan Hernadez Aarti / Product Type: Managed Care Medicaid /      In time: 134 Out time: 133   Total Treatment Time: 27     Medicare/BCBS Time Tracking (below)   Total Timed Codes (min):  NA 1:1 Treatment Time:  NA     TREATMENT AREA =  Left knee pain [M25.562]  Right knee pain [M25.561]    SUBJECTIVE    Pain Level (on 0 to 10 scale):  7  / 10   Medication Changes/New allergies or changes in medical history, any new surgeries or procedures? NO    If yes, update Summary List   Subjective Functional Status/Changes:  []  No changes reported     Jose Alfredo Barnard reports 40% improvement since start of care and has not been able to attend therapy consistently. Missed appointments are attributed to travel and exacerbation of visual impairments. She reports descending stairs has become easier since start of care. OBJECTIVE  Therapeutic Procedures:  Min Procedure Specifics + Rationale   17 [x] Therapeutic Exercise    See Flowsheet   Rationale: increase ROM and increase strength to improve the patients ability to participate in ADL's        10 [x] Therapeutic Activity See Flow Sheet  FOTO and Reassessment  Squats  Rationale: To improve safety, proprioception, coordination, and efficiency with tasks such as stair negotiation, transfers, bed mobility, and lifting mechanics        min Patient Education:  YES Reviewed HEP         Other Objective/Functional Measures:    See flowsheet for more details    See PN       VC and demos required throughout session for proper form and increased safety         Post Treatment Pain Level (on 0 to 10) scale:   7  / 10     ASSESSMENT    Assessment/Changes in Function:   Jose Alfredo Barnard reports 40% improvement since start of care and has not been able to attend therapy consistently.  Missed appointments are attributed to travel and exacerbation of visual impairments. She reports descending stairs has become easier since start of care. She has met minimal goals secondary to only being able to attend 4 follow up sessions in 5 weeks. Within therapy sessions she demonstrates varied effort while performing exercises. Routinely needing encouragement. Walking tolerance continues to be limited. She was only able to ambulate 90 ft before requiring rest breaks. However FOTO outcomes survey improve from 28/100 to 39/100. []  See Progress Note/Recertification   Patient will continue to benefit from skilled PT services to analyze,, cue,, progress,, modify,, demonstrate,, instruct, and address, movement patterns,, therapeutic interventions,, postural abnormalities,, soft tissue restrictions,, ROM,, strength,, functional mobility,, body mechanics/ergonomics, and home and community integration, to attain remaining goals. Progress toward goals / Updated goals: · Short Term Goals: To be accomplished in  2  weeks:  1. Pt will be compliant with HEP for symptom management at home. MET: Compliant  2. Pt will demo sit>stand without UE assist for ease at home. MET: sit<>stand without UE support  3. Pt will amb 250feet without rest break for ease negt in community. NOT MET: 90ft without rest break  · Long Term Goals: To be accomplished in  4  weeks:  1. Pt will be independent with HEP at D/C for self management. NOT MET  2. Pt will demo lateral romana stepover without safety concerns for ease getting into bathtub. 3. Pt will demo ability to step up onto 6in step for ease in community. MET: able to with nonreciprocal pattern and BUE support  4. Pt will increase FS FOTO Score to >/= 38 to indicate a significant decrease in functional disability.    MET: 39/100        PLAN    [x]  Upgrade activities as tolerated YES Continue plan of care   []  Discharge due to :    []  Other:      Therapist: Brandy Garcia DPT Date: 6/10/2021 Time: 8:51 AM     Future Appointments   Date Time Provider Ra Freedman   6/15/2021  8:45 AM Baljinder Ny [facility]   6/17/2021  8:45 AM Baljinder Ny [facility]   6/17/2021  9:30 AM ANGELES Fink/L [facility]   6/22/2021  8:45 AM Tiki Hoffman [facility]   6/24/2021  8:45 AM Baljinder Ny [facility]   6/29/2021  8:45 AM Bela Khalil [facility]

## 2021-06-15 ENCOUNTER — APPOINTMENT (OUTPATIENT)
Dept: PHYSICAL THERAPY | Age: 61
End: 2021-06-15
Payer: MEDICAID

## 2021-06-17 ENCOUNTER — HOSPITAL ENCOUNTER (OUTPATIENT)
Dept: PHYSICAL THERAPY | Age: 61
Discharge: HOME | End: 2021-06-17
Payer: MEDICAID

## 2021-06-17 PROCEDURE — 97112 NEUROMUSCULAR REEDUCATION: CPT

## 2021-06-17 PROCEDURE — 97530 THERAPEUTIC ACTIVITIES: CPT

## 2021-06-17 PROCEDURE — 97110 THERAPEUTIC EXERCISES: CPT

## 2021-06-17 PROCEDURE — 97763 ORTHC/PROSTC MGMT SBSQ ENC: CPT

## 2021-06-17 PROCEDURE — 97116 GAIT TRAINING THERAPY: CPT

## 2021-06-17 NOTE — PROGRESS NOTES
OCCUPATIONAL THERAPY - DAILY TREATMENT NOTE    Patient Name: Bentley Morgan        Date: 2021  : 1960   YES Patient  Verified  Visit #:   2   of   4  Insurance: Payor: [facility] MEDICAID / Plan: [facility] [employer] PLUS / Product Type: Managed Care Medicaid /      In time: 9:20 Out time: 9:35   Total Treatment Time: 15     TREATMENT AREA =  Right IF    SUBJECTIVE    Pain Level (on 0 to 10 scale):  ache  / 10   Medication Changes/New allergies or changes in medical history, any new surgeries or procedures? NO    If yes, update Summary List   Subjective Functional Status/Changes:  []  No changes reported     I got in the shower with it on Saturday and it changed shape. OBJECTIVE     min Therapeutic Exercise:  [x]  See flow sheet     15 min Orthotic/Splinting: Fabricated second MP blocking splint   Rationale:       min Patient Education:  YES  Reviewed HEP   []  Progressed/Changed HEP based on: Other Objective/Functional Measures:    Reviewed splint use. She reports having less pain with splint but heat from shower changed it. Post Treatment Pain Level (on 0 to 10) scale:   ache  / 10     ASSESSMENT  Assessment/Changes in Function:     Seems to be better. []  See Progress Note/Recertification   Patient will continue to benefit from skilled OT services to address pain  to attain remaining goals. Progress toward goals / Updated goals: Follow up next week and DC if improving.       PLAN  [x]  Upgrade activities as tolerated YES Continue plan of care   []  Discharge due to :    []  Other:      Therapist: ANGELES Miles/SAMMIE    Date: 2021 Time: 9:06 AM

## 2021-06-17 NOTE — PROGRESS NOTES
PHYSICAL THERAPY - DAILY TREATMENT NOTE    Patient Name: Virgil Win        Date: 2021  : 1960   YES Patient  Verified  Visit #:   6   of   8  Insurance: Payor: [facility] MEDICAID / Plan: [facility] [employer] PLUS / Product Type: Managed Care Medicaid /      In time: 340 Out time: 806   Total Treatment Time: 36     TREATMENT AREA =  Left knee pain [M25.562]  Right knee pain [M25.561]    SUBJECTIVE    Pain Level (on 0 to 10 scale):  6  / 10   Medication Changes/New allergies or changes in medical history, any new surgeries or procedures? NO    If yes, update Summary List   Subjective Functional Status/Changes:  []  No changes reported     Pt reports no changes in condition since last session. OBJECTIVE  Therapeutic Procedures:  Min Procedure Specifics + Rationale   12 [x] Therapeutic Exercise    See Flowsheet   Rationale: increase ROM and increase strength to improve the patients ability to participate in ADL's      8 [x] Gait Training See Flow Sheet    Seated and Standing HR/TR for push off and rockers during gait  55ft gait c SPC before requiring seated rest break. Educated regarding safe effective AD use    Rationale: To improve functional mechanics associated with gait training on even and uneven surfaces to promote mobility     8 [x] Therapeutic Activity See Flow Sheet    Sit<>Stands and Mini-Squats for improved transfers    Rationale: To improve safety, proprioception, coordination, and efficiency with tasks such as stair negotiation, transfers, bed mobility, and lifting mechanics   8 [x] Neuromuscular Re-Education See Flow Sheet    Relative SLS during hip 3 way    Rationale:    To improve stability over various surfaces to decrease falls risk          min Patient Education:  YES Reviewed HEP         Other Objective/Functional Measures:    See flowsheet for more details    Patient Education regarding the following during session:  1.educated to use SPC secondary to decrease gait stability    Mod/min VC and demos required throughout session for proper form and increased safety         Post Treatment Pain Level (on 0 to 10) scale:   5  / 10     ASSESSMENT    Assessment/Changes in Function:     Decreased pain within session, increased fatigue, tolerated session fairly. []  See Progress Note/Recertification   Patient will continue to benefit from skilled PT services to analyze,, cue,, progress,, modify,, demonstrate,, instruct, and address, movement patterns,, therapeutic interventions,, postural abnormalities,, soft tissue restrictions,, ROM,, strength,, functional mobility,, body mechanics/ergonomics, and home and community integration, to attain remaining goals.    Progress toward goals / Updated goals:    1st session since progress note, no notable progress yet      PLAN    [x]  Upgrade activities as tolerated YES Continue plan of care   []  Discharge due to :    []  Other:      Therapist: Brandy Garcia DPT    Date: 6/17/2021 Time: 8:47 AM     Future Appointments   Date Time Provider Ra Freedman   6/17/2021  9:30 AM ANGELES Pearce/L [facility]   6/22/2021  8:45 AM Gisella Mendosa [facility]   6/24/2021  8:45 AM Gloria Ny [facility]   6/29/2021  8:45 AM Leonardo Klinefelter, Crystal [facility]

## 2021-06-22 ENCOUNTER — APPOINTMENT (OUTPATIENT)
Dept: PHYSICAL THERAPY | Age: 61
End: 2021-06-22
Payer: MEDICAID

## 2021-06-22 ENCOUNTER — HOSPITAL ENCOUNTER (OUTPATIENT)
Dept: PHYSICAL THERAPY | Age: 61
Discharge: HOME | End: 2021-06-22
Payer: MEDICAID

## 2021-06-22 PROCEDURE — 97530 THERAPEUTIC ACTIVITIES: CPT

## 2021-06-22 PROCEDURE — 97110 THERAPEUTIC EXERCISES: CPT

## 2021-06-22 NOTE — PROGRESS NOTES
PHYSICAL THERAPY - DAILY TREATMENT NOTE    Patient Name: Moon De        Date: 2021  : 1960   YES Patient  Verified  Visit #:     Insurance: Payor: [facility] MEDICAID / Plan: [facility] [employer] PLUS / Product Type: Managed Care Medicaid /      In time: 8:45 Out time: 9:16   Total Treatment Time: 31     Medicare/BCBS [place] Time Tracking (below)   Total Timed Codes (min):  NA 1:1 Treatment Time:  NA     TREATMENT AREA =  Left knee pain [M25.562]  Right knee pain [M25.561]    SUBJECTIVE    Pain Level (on 0 to 10 scale):  6  / 10   Medication Changes/New allergies or changes in medical history, any new surgeries or procedures? NO    If yes, update Summary List   Subjective Functional Status/Changes:  []  No changes reported     Pt reports she has a shower chair, but has difficulty lifting her legs to step into the bath tub. OBJECTIVE    21 min Therapeutic Exercise:  [x]  See flow sheet   Rationale:    increase ROM, increase strength, improve coordination, improve balance and increase proprioception to promote increased functional mobility and increased activity tolerance with ADL's. 10 min Therapeutic Activity: mimicking stepping in/out of bath tub   sit<>stand    Rationale:    increase ROM, increase strength, improve coordination, improve balance and increase proprioception to promote increased functional mobility and increased activity tolerance with ADL's.     min Patient Education:  YES  Reviewed HEP   []  Progressed/Changed HEP based on: Will update HEP NV. Educated pt on option of shower bench to facilitate in/out of tub for safety. Other Objective/Functional Measures:    Attempted to step over large romana to mimic stepping into bath tub, pt able to with left LE, but not with right LE secondary to limited knee flexion ROM and pain. Smaller obstacle for right LE allowed pt to clear right LE.      Discussed D/C planning NV, secondary to pt still challenged with current therapeutic exercises, pt agreeable. Pt fatigued and c/o increased right>left knee pain with standing therapeutic activity. Pt required frequent short seated rest breaks. Alternated between standing and sitting activity for improved tolerance. Post Treatment Pain Level (on 0 to 10) scale:   6 / 10     ASSESSMENT    Assessment/Changes in Function:     Pt continues with limited functional mobility as evidenced by bath transfer. []  See Progress Note/Recertification   Patient will continue to benefit from skilled PT services to modify and progress therapeutic interventions, address functional mobility deficits, address ROM deficits, address strength deficits, analyze and address soft tissue restrictions, analyze and cue movement patterns, assess and modify postural abnormalities, and instruct in home and community integration to attain remaining goals. Progress toward goals / Updated goals:    1. Pt will amb 250 feet without rest break for ease negt in community. 2. Pt will be independent with HEP at D/C for self management. 3. Pt will demo lateral romana stepover without safety concerns for ease getting into bathtub.  difficulty with stepping over right>left LE     PLAN    []  Upgrade activities as tolerated YES Continue plan of care   []  Discharge due to :    []  Other: Reassess NV      Therapist: Kamila Miramontes PTA    Date: 6/22/2021 Time: 8:49 AM     Future Appointments   Date Time Provider Ra Freedman   6/24/2021  8:45 AM León Mathieu [facility]   6/29/2021  8:45 AM Neva [facility]

## 2021-06-24 ENCOUNTER — APPOINTMENT (OUTPATIENT)
Dept: PHYSICAL THERAPY | Age: 61
End: 2021-06-24
Payer: MEDICAID

## 2021-06-24 ENCOUNTER — HOSPITAL ENCOUNTER (OUTPATIENT)
Dept: PHYSICAL THERAPY | Age: 61
Discharge: HOME | End: 2021-06-24
Payer: MEDICAID

## 2021-06-24 PROCEDURE — 97530 THERAPEUTIC ACTIVITIES: CPT

## 2021-06-24 PROCEDURE — 97116 GAIT TRAINING THERAPY: CPT

## 2021-06-24 PROCEDURE — 97110 THERAPEUTIC EXERCISES: CPT

## 2021-06-24 PROCEDURE — 97112 NEUROMUSCULAR REEDUCATION: CPT

## 2021-06-24 NOTE — PROGRESS NOTES
[address] [facility] PHYSICAL THERAPY  [address] [facility] [place] [address], [address] - Phone: [phone]  Fax: [phone]  DISCHARGE SUMMARY FOR PHYSICAL THERAPY          Patient Name: Savage Diaz : 1960   Treatment/Medical Diagnosis: Left knee pain [M25.562]  Right knee pain [M25.561]   Referral Source: Tammy Romero MD Start of Care [facility]): 2021   Prior Hospitalization: See Medical History Provider #: [id number]   Medications: Verified on Patient Summary List   Visits from [facility]: 7 Missed Visits: 4     GOALS               Goal/Measure of Progress Goal Met? 1.   Pt will amb 250feet without rest break for ease negt in community. Status at last Eval: 90ft Current Status: 150ft NOT MET   2.  Pt will demo lateral romana stepover without safety concerns for ease getting into bathtub.      Status at last Eval: NT Current Status: Unable today secondary to pain NOT MET              3.  Pt will be independent with HEP at D/C for self management. Status at last Eval: Compliant with HEP Current Status: Min VC and infrequent compliance Not MET        SUMMARY OF TREATMENT  Patient's POC has consisted of therex, therapeutic activities, manual therapy prn, modalities prn, pt. education, and a comprehensive HEP. Treatment strategies used to address functional mobility deficits, ROM deficits, strength deficits, analyze and address soft tissue restrictions, analyze and cue movement patterns, analyze and modify body mechanics/ergonomics, assess and modify postural abnormalities and instruct in home and community integration. Key Functional Changes/Progress: Pt reports that therapy has helped little in her recovery and that she moves around a little better. Her FOTO survey improved to 51/100 from 28/100 since start of care. She is able to ambulate 150ft before requiring seated rest break. Still reporting high levels of pain each session with minimal improvements within session. Secondary to insurance authorization and minimal changes to pain, pt will be discharged today with HEP    Assessments/Recommendations: Discontinue therapy. Progressing towards or have reached established goals. If you have any questions/comments please contact us directly at [phone]. Thank you for allowing us to assist in the care of your patient. Therapist Signature:  Malou Chacon DPT Date: 6/24/2021   Reporting Period: NA Time: 1:65 AM      Certification Period: NA       NOTE TO PHYSICIAN:  PLEASE COMPLETE THE ORDERS BELOW AND FAX TO   [facility]: [phone]. If you are unable to process this request in 24 hours please contact our office: [phone].    ___ I have read the above report and request that my patient be discharged from therapy.      Physician Signature:        Date:       Time:    Corine Montoya MD

## 2021-06-24 NOTE — PROGRESS NOTES
PHYSICAL THERAPY - DAILY TREATMENT NOTE    Patient Name: Sapphire Topete        Date: 2021  : 1960   YES Patient  Verified  Visit #:   8     Insurance: Payor: [facility] MEDICAID / Plan: [facility] [email or web address] EL[employer] PLUS / Product Type: Managed Care Medicaid /      In time: 912 Out time: 920   Total Treatment Time: 38     TREATMENT AREA =  Left knee pain [M25.562]  Right knee pain [M25.561]    SUBJECTIVE    Pain Level (on 0 to 10 scale):  8  / 10   Medication Changes/New allergies or changes in medical history, any new surgeries or procedures? NO    If yes, update Summary List   Subjective Functional Status/Changes:  []  No changes reported     Pt reports that therapy has helped little in her recovery and that she move around a little better. Compliance with HEP  Yes [] No []         OBJECTIVE  Therapeutic Procedures:  Min Procedure Specifics + Rationale   14 [x] Therapeutic Exercise    See Flowsheet   Rationale: increase ROM and increase strength to improve the patients ability to participate in ADL's      8 [x] Gait Training See Flow Sheet  150ft Gait Training with no AD    Rationale: To improve functional mechanics associated with gait training on even and uneven surfaces to promote mobility     8 [x] Therapeutic Activity See Flow Sheet    FOTO   Reassessment    Rationale: To improve safety, proprioception, coordination, and efficiency with tasks such as stair negotiation, transfers, bed mobility, and lifting mechanics   8 [x] Neuromuscular Re-Education See Flow Sheet  Relative SLS during Hip 3 way    Rationale:    To improve stability over various surfaces to decrease falls risk        min Patient Education:  YES Reviewed HEP         Other Objective/Functional Measures:    See flowsheet for more details    FOTO Survey: 51/100    min VC and demos required throughout session for proper form and increased safety         Post Treatment Pain Level (on 0 to 10) scale:   9   10 ASSESSMENT    Assessment/Changes in Function:     See DC     []  See Progress Note/Recertification   Patient will continue to benefit from skilled PT services to analyze,, cue,, progress,, modify,, demonstrate,, instruct, and address, movement patterns,, therapeutic interventions,, postural abnormalities,, soft tissue restrictions,, ROM,, strength,, functional mobility,, body mechanics/ergonomics, and home and community integration, to attain remaining goals. Progress toward goals / Updated goals:            Goal/Measure of Progress Goal Met? 1.   Pt will amb 250feet without rest break for ease negt in community. Status at last Eval: 90ft Current Status: 150ft NOT MET   2. Pt will demo lateral romana stepover without safety concerns for ease getting into bathtub. Status at last Eval: NT Current Status: Unable today secondary to pain NOT MET      3. Pt will be independent with HEP at D/C for self management.    Status at last Eval: Compliant with HEP Current Status: Min VC and infrequent compliance Not MET            PLAN    [x]  Upgrade activities as tolerated NO Continue plan of care   []  Discharge due to :    []  Other:      Therapist: Schuyler Fairchild DPT    Date: 6/24/2021 Time: 7:00 AM     Future Appointments   Date Time Provider Ra Freedman   6[facility]24/2021  8:45 [facility]   6/29/2021  8:45 AM Amrita Krishna [facility]

## 2021-06-24 NOTE — PROGRESS NOTES
[address] [facility] PHYSICAL THERAPY  [address] [facility] [address], [address] - Phone: [phone]  Fax: [phone]  [address] [place] [address] THERAPY          Patient Name: Bentley Morgan : 1960   Medical/Treatment Diagnosis: Pain in right hand [M79.641]   Onset Date: 2021    Referral Source: Mary Upton MD Start of Care [facility]): 21   Prior Hospitalization: See Medical History Provider #: [id number]   Prior Level of Function: Ind   Comorbidities: na   Medications: Verified on Patient Summary List   Visits from [facility]: 2 Missed Visits: 0       Goal/Measure of Progress Goal Met? 1. Ind HEP   Status at last Eval: Provided  Current Status: Using splint prn Progressing    2. Fabricate right MP IF blocking splint for continued wear   Status at last Eval: Instructed  Current Status: Wearing prn Progressing    3. No pain with ADLs   Status at last Eval: 3/10 Current Status: ache yes   4. Baseline use right hand with IADLs   Status at last Eval: With pain Current Status: Less pain when wearing splint Progressing      Key Functional Changes/Progress: Inconsistent use of splint affects progress with trigger finger but she has the potential to improve with consistent use. Assessments/Recommendations: Discontinue therapy. Progressing towards or have reached established goals. If you have any questions/comments please contact us directly at [phone]. Thank you for allowing us to assist in the care of your patient. Therapist Signature: RICARDO Miles Date: 21   Reporting Period: 21-21 Time: 11:39 AM       NOTE TO PHYSICIAN:  PLEASE COMPLETE THE ORDERS BELOW AND FAX TO   [facility]: [phone]. If you are unable to process this request in 24 hours please contact our office: [phone].    ___ I have read the above report and request that my patient be discharged from therapy.      Physician Signature:        Date:       Time:                                       Melisa Velasco MD

## 2021-06-29 ENCOUNTER — APPOINTMENT (OUTPATIENT)
Dept: PHYSICAL THERAPY | Age: 61
End: 2021-06-29
Payer: MEDICAID

## 2021-12-06 ENCOUNTER — OFFICE VISIT (OUTPATIENT)
Dept: ORTHOPEDIC SURGERY | Age: 61
End: 2021-12-06
Payer: MEDICAID

## 2021-12-06 VITALS
BODY MASS INDEX: 45.99 KG/M2 | HEIGHT: 67 IN | WEIGHT: 293 LBS | OXYGEN SATURATION: 95 % | HEART RATE: 121 BPM | TEMPERATURE: 96.5 F

## 2021-12-06 DIAGNOSIS — M17.12 PRIMARY OSTEOARTHRITIS OF LEFT KNEE: Primary | ICD-10-CM

## 2021-12-06 DIAGNOSIS — M25.562 CHRONIC PAIN OF LEFT KNEE: ICD-10-CM

## 2021-12-06 DIAGNOSIS — G89.29 CHRONIC PAIN OF LEFT KNEE: ICD-10-CM

## 2021-12-06 DIAGNOSIS — M25.561 CHRONIC PAIN OF RIGHT KNEE: ICD-10-CM

## 2021-12-06 DIAGNOSIS — M17.11 PRIMARY OSTEOARTHRITIS OF RIGHT KNEE: ICD-10-CM

## 2021-12-06 DIAGNOSIS — G89.29 CHRONIC PAIN OF RIGHT KNEE: ICD-10-CM

## 2021-12-06 PROCEDURE — 99213 OFFICE O/P EST LOW 20 MIN: CPT | Performed by: SPECIALIST

## 2021-12-06 NOTE — PROGRESS NOTES
Patient: Anupam Crespo                MRN: [id number]       SSN: [id number]  YOB: 1960        AGE: 59 y.o. SEX: female      PCP: Linn Gomez MD  12/06/21    Chief Complaint   Patient presents with    Knee Pain     Bilat      HISTORY:  Anupam Crespo is a 59 y.o. female who is seen for bilateral knee pain R>L. She states that she her knee pain increased after receiving her Covid-19 vaccine. She reports occasional swelling in her knees. She feels as though her left knee is going to give out on her. She has pain with walking. She feels a very sharp pain in her knees. She feels grinding and popping sensations. She reports relief from aquatic physical therapy & Euflexxa series completed in April 2016, 2/28/19 and 2/4/21. Previous cortisone injections did not help as much as she would have liked. A visco supplementation on 10/11/21 at pain management worsened her pains. She had to take 10 Percocets and Tylenol to feel some pain relief. She was previously seen for right index finger pain. Ms. Monica Ribeiro reports that in the morning her index finger gets trapped in flexion and she has to snap it into extension with her other hand. She thinks she has arthritis in her fingers. She was previously seen for back pain and degenerative scoliosis. She was previously seen by Dr. Prabhu Sanabria at the spine center. She is still in pain management at [facility]. Pain Assessment  12/6/2021   Location of Pain Knee   Location Modifiers Left;Right   Severity of Pain 9   Quality of Pain Aching; Isidro Keel; Throbbing   Quality of Pain Comment -   Duration of Pain Persistent   Frequency of Pain Constant   Aggravating Factors Standing;Walking   Aggravating Factors Comment -   Limiting Behavior Yes   Relieving Factors Rest   Relieving Factors Comment -   Result of Injury No     Occupation, etc: Ms. Monica Ribeiro receives [address] [employer] [employer] [address] [address] for hypertension and diabetes.  She quit working for the [employer] as a  for [address] seven years ago after her work injury. Her grand daughter drives her around since she does not drive. She lives alone in [place]. She has 2 children with several grandchildren in the area. She lost 10 pounds by cutting out fried foods and sodas. Current weight is 305 pounds. Current height is 5'7\". She is an insulin controlled diabetic. She states her most recent A1c was a 8. She has been vaccinated for Covid-19. Last 3 Recorded Weights in this Encounter    12/06/21 0817   Weight: 305 lb (138.3 kg)     Body mass index is 47.77 kg/m². 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 from DM M79.2    Obesity, morbid (HCC) E66.01    Right hand pain M79.641       REVIEW OF SYSTEMS: All Below are Negative except: See HPI     Constitutional: negative for fever, chills, and weight loss. Cardiovascular: negative for chest pain, claudication, leg swelling, SOB, PAGE   Gastrointestinal: Negative for pain, N/V/C/D, Blood in stool or urine, dysuria,  hematuria, incontinence, pelvic pain. Musculoskeletal: See HPI   Neurological: Negative for dizziness and weakness. Negative for headaches, Visual changes, confusion, seizures   Phychiatric/Behavioral: Negative for depression, memory loss, substance  abuse. Extremities: Negative for hair changes, rash, or skin lesion changes. Hematologic: Negative for bleeding problems, bruising, pallor or swollen lymph  nodes   Peripheral Vascular: No calf pain, no circulation deficits.     Social History Socioeconomic History    Marital status:      Spouse name: Not on file    Number of children: Not on file    Years of education: Not on file    Highest education level: Not on file   Occupational History    Not on file   Tobacco Use    Smoking status: Former Smoker    Smokeless tobacco: Never Used   Substance and Sexual Activity    Alcohol use: No    Drug use: No    Sexual activity: Not Currently   Other Topics Concern    Not on file   Social History Narrative    ** Merged History Encounter **          Social Determinants of Health     Financial Resource Strain:     Difficulty of Paying Living Expenses: Not on file   Food Insecurity:     Worried About Running Out of Food in the Last Year: Not on file    Surya of Food in the Last Year: Not on file   Transportation Needs:     Lack of Transportation (Medical): Not on file    Lack of Transportation (Non-Medical):  Not on file   Physical Activity:     Days of Exercise per Week: Not on file    Minutes of Exercise per Session: Not on file   Stress:     Feeling of Stress : Not on file   Social Connections:     Frequency of Communication with Friends and Family: Not on file    Frequency of Social Gatherings with Friends and Family: Not on file    Attends Worship Services: Not on file    Active Member of [address] [place] [address] [employer] or Organizations: Not on file    Attends Club or Organization Meetings: Not on file    Marital Status: Not on file   Intimate Partner Violence:     Fear of Current or Ex-Partner: Not on file    Emotionally Abused: Not on file    Physically Abused: Not on file    Sexually Abused: Not on file   Housing Stability:     Unable to Pay for Housing in the Last Year: Not on file    Number of [place] in the Last Year: Not on file    Unstable Housing in the Last Year: Not on file        Allergies   Allergen Reactions    Latex Rash    Aspirin Nausea and Vomiting     itching    Vicodin [Hydrocodone-Acetaminophen] Itching Current Outpatient Medications   Medication Sig    gabapentin (NEURONTIN) 300 mg capsule Take 1 Cap by mouth three (3) times daily. Indications: NEUROPATHIC PAIN    diclofenac EC (VOLTAREN) 75 mg EC tablet take 1 tablet by mouth twice a day    oxyCODONE-acetaminophen (PERCOCET) 7.5-325 mg per tablet Take 1 tab PO every day to TID as needed for pain. DO NOT FILL:5/30/17    amLODIPine (NORVASC) 5 mg tablet 5 mg.  aspirin delayed-release 81 mg tablet 81 mg.    glipiZIDE SR (GLUCOTROL XL) 2.5 mg CR tablet 2.5 mg.    hydroCHLOROthiazide (HYDRODIURIL) 25 mg tablet 25 mg.  pravastatin (PRAVACHOL) 20 mg tablet 40 mg.    BD INSULIN PEN NEEDLE UF MINI 31 gauge x 3/16\" ndle     albuterol (PROVENTIL VENTOLIN) 2.5 mg /3 mL (0.083 %) nebulizer solution     LANTUS SOLOSTAR 100 unit/mL (3 mL) pen inject 60 units subcutaneously at bedtime    MUCINEX 600 mg ER tablet     BYETTA 5 mcg/dose (250 mcg/mL) 1.2 mL injection     CONTOUR METER misc use as directed    BD INSULIN SYRINGE ULTRA-FINE 0.5 mL 31 gauge x 5/16 syrg USE THREE TIMES DAILY AS DIRECTED    BD INSULIN PEN NEEDLE UF ORIG 29 gauge x 1/2\" ndle USE 4 TIMES DAILY    ASCENSIA CONTOUR strip USE 1 STRIP 3 TIMES DAILY    fluticasone (FLONASE) 50 mcg/actuation nasal spray instill 1 spray into each nostril twice a day    polyethylene glycol (MIRALAX) 17 gram/dose powder MIX 17 GM IN  8 OZ OF LIQUID AND DRINL 1 TO 2 TIMES DAILY FOR CONSTIPATION    NOVOLOG FLEXPEN 100 unit/mL inpn     metoprolol succinate (TOPROL-XL) 25 mg XL tablet     ADVAIR DISKUS 250-50 mcg/dose diskus inhaler     atorvastatin (LIPITOR) 20 mg tablet Take 20 mg by mouth daily.  PROAIR HFA 90 mcg/actuation inhaler     cetirizine (ZYRTEC) 10 mg tablet Take 10 mg by mouth nightly.  montelukast (SINGULAIR) 10 mg tablet Take 10 mg by mouth daily.  ranitidine (ZANTAC) 150 mg tablet Take 150 mg by mouth two (2) times a day.     chlorthalidone (HYGROTEN) 25 mg tablet Take 25 mg by mouth daily.  ibuprofen (MOTRIN IB) 200 mg tablet Take  by mouth.  metFORMIN (GLUCOPHAGE) 500 mg tablet Take 500 mg by mouth two (2) times daily (with meals). No current facility-administered medications for this visit. PHYSICAL EXAMINATION:  Visit Vitals  Pulse (!) 121   Temp (!) 96.5 °F (35.8 °C) (Temporal)   Ht 5' 7\" (1.702 m)   Wt 305 lb (138.3 kg)   SpO2 95%   BMI 47.77 kg/m²        ORTHO EXAMINATION:  Examination Left knee Right knee   Skin Intact Intact   Range of motion 100-10 100-10   Effusion + -   Medial joint line tenderness + +   Lateral joint line tenderness - -   Popliteal tenderness - -   Osteophytes palpable + +   Tommys - -   Patella crepitus + +   Anterior drawer - -   Lateral laxity - -   Medial laxity - -   Varus deformity + +   Valgus deformity - -   Pretibial edema - -   Calf tenderness - -       RADIOGRAPHS:  XR MALDONADO KNEE 12/14/18 RODNEY  IMPRESSION:  Three views - No fractures, no effusion, severe medial joint space narrowing, + osteophytes present. IKDC Grade D    XR KNEE RIGHT 1/16/15  IMPRESSION:  1. DJD most pronounced medially and patellofemoral joint with large joint effusion. 2. No acute osseous finding. XR LEFT KNEE 11/21/12  IMPRESSION: No acute osseous abnormality. Tricompartmental degenerative  changes. Joint effusion, suspicious for internal derangement. IMPRESSION:      ICD-10-CM ICD-9-CM    1. Primary osteoarthritis of left knee  M17.12 715.16 REFERRAL TO PHYSICAL THERAPY   2. Chronic pain of left knee  M25.562 719.46 REFERRAL TO PHYSICAL THERAPY    G89.29 338.29    3. Primary osteoarthritis of right knee  M17.11 715.16 REFERRAL TO PHYSICAL THERAPY   4. Chronic pain of right knee  M25.561 719.46 REFERRAL TO PHYSICAL THERAPY    G89.29 338.29      PLAN: She will be referred for bariatric surgery consultation. She will start a brief course of outpatient physical therapy.  We discussed possible need for a knee arthroplasty at some time in the future if pain continues, pending weight loss. She will follow up as needed.       Scribed by MD Bogdan WardStoneSprings Hospital Center) as dictated by Rene Qiu MD

## 2022-07-25 DIAGNOSIS — M17.12 PRIMARY OSTEOARTHRITIS OF LEFT KNEE: ICD-10-CM

## 2022-07-25 DIAGNOSIS — M17.11 PRIMARY OSTEOARTHRITIS OF RIGHT KNEE: ICD-10-CM

## 2022-08-18 ENCOUNTER — DOCUMENTATION ONLY (OUTPATIENT)
Dept: ORTHOPEDIC SURGERY | Age: 62
End: 2022-08-18

## 2022-08-26 ENCOUNTER — OFFICE VISIT (OUTPATIENT)
Dept: ORTHOPEDIC SURGERY | Age: 62
End: 2022-08-26
Payer: MEDICAID

## 2022-08-26 VITALS — HEIGHT: 67 IN | BODY MASS INDEX: 45.99 KG/M2 | WEIGHT: 293 LBS | TEMPERATURE: 96.8 F

## 2022-08-26 DIAGNOSIS — G89.29 CHRONIC LOW BACK PAIN, UNSPECIFIED BACK PAIN LATERALITY, UNSPECIFIED WHETHER SCIATICA PRESENT: ICD-10-CM

## 2022-08-26 DIAGNOSIS — M25.562 CHRONIC PAIN OF LEFT KNEE: ICD-10-CM

## 2022-08-26 DIAGNOSIS — G89.29 CHRONIC PAIN OF LEFT KNEE: ICD-10-CM

## 2022-08-26 DIAGNOSIS — M25.561 CHRONIC PAIN OF RIGHT KNEE: ICD-10-CM

## 2022-08-26 DIAGNOSIS — M54.50 CHRONIC LOW BACK PAIN, UNSPECIFIED BACK PAIN LATERALITY, UNSPECIFIED WHETHER SCIATICA PRESENT: ICD-10-CM

## 2022-08-26 DIAGNOSIS — M17.12 PRIMARY OSTEOARTHRITIS OF LEFT KNEE: Primary | ICD-10-CM

## 2022-08-26 DIAGNOSIS — M17.11 PRIMARY OSTEOARTHRITIS OF RIGHT KNEE: ICD-10-CM

## 2022-08-26 DIAGNOSIS — G89.29 CHRONIC PAIN OF RIGHT KNEE: ICD-10-CM

## 2022-08-26 PROCEDURE — 20610 DRAIN/INJ JOINT/BURSA W/O US: CPT | Performed by: SPECIALIST

## 2022-08-26 NOTE — PROGRESS NOTES
Patient: Mary Byrd                MRN: [id number]       SSN: [id number]  YOB: 1960        AGE: 59 y.o. SEX: female      PCP: Theo Solomon MD  08/26/22    Chief Complaint   Patient presents with    Injection     Bilat kneee inj       HISTORY:  Mary Byrd is a 59 y.o. female who is seen for bilateral knee pain R>L. She states that she her knee pain increased after receiving her Covid-19 vaccine. She reports occasional swelling in her knees. She feels as though her left knee is going to give out on her. She has pain with walking. She feels a very sharp pain in her knees. She feels grinding and popping sensations. She reports relief from aquatic physical therapy & Euflexxa series completed in April 2016, 2/28/19 and 2/4/21. Previous cortisone injections did not help as much as she would have liked. A visco supplementation on 10/11/21 at pain management worsened her pains. She had to take 10 Percocets and Tylenol to feel some pain relief. She was previously seen for right index finger pain. Ms. Kelsy Burnett reports that in the morning her index finger gets trapped in flexion and she has to snap it into extension with her other hand. She thinks she has arthritis in her fingers. She was previously seen for back pain and degenerative scoliosis. She was previously seen by Dr. Tiffanie Freire at the spine center. She is still in pain management at [facility].     Pain Assessment  8/26/2022   Location of Pain Knee   Location Modifiers Left;Right   Severity of Pain 7   Quality of Pain Aching;Dull   Quality of Pain Comment -   Duration of Pain Persistent   Frequency of Pain Constant   Date Pain First Started (No Data)   Date Pain First Started Comment f.u   Aggravating Factors Other (Comment)   Aggravating Factors Comment just hurts   Limiting Behavior Yes   Relieving Factors Nothing   Relieving Factors Comment -   Result of Injury No     Occupation, etc: Ms. Kelsy Burnett receives [employer] for hypertension and diabetes. She quit working for the [employer] crew as a  for [address] seven years ago after her work injury. Her grand daughter drives her around since she does not drive. She lives alone in [place]. She has 2 children with several grandchildren in the area. She lost 10 pounds by cutting out fried foods and sodas. Current weight is 305 pounds. Current height is 5'7\". She is an insulin controlled diabetic. She states her most recent A1c was a 8. She has been vaccinated for Covid-19. She is going on a cruise to [place] in October, curtesy of her grand daughter. Last 3 Recorded Weights in this Encounter    08/26/22 0916   Weight: 305 lb (138.3 kg)     Body mass index is 47.77 kg/m². Patient Active Problem List   Diagnosis Code    Sciatica M54.30    Lumbar strain S39.012A    Degenerative scoliosis in adult patient M41.80    Chronic pain of both knees M25.561, M25.562, G89.29    Primary osteoarthritis of both knees M17.0    Hemangioma w/atypia L5, stable on 8/16, 4/17 MRIs D18.00    Facet arthropathy, lumbar M47.816    Chronic bilateral low back pain without sciatica M54.50, G89.29    Therapeutic drug monitoring Z51.81    Peripheral neuralgia, prob. from DM M79.2    Obesity, morbid ([facility] [address] E66.01    Right hand pain M79.641       REVIEW OF SYSTEMS: All Below are Negative except: See HPI     Constitutional: negative for fever, chills, and weight loss. Cardiovascular: negative for chest pain, claudication, leg swelling, SOB, PAGE   Gastrointestinal: Negative for pain, N/V/C/D, Blood in stool or urine, dysuria,  hematuria, incontinence, pelvic pain. Musculoskeletal: See HPI   Neurological: Negative for dizziness and weakness. Negative for headaches, Visual changes, confusion, seizures   Phychiatric/Behavioral: Negative for depression, memory loss, substance  abuse. Extremities: Negative for hair changes, rash, or skin lesion changes.    Hematologic: Negative for bleeding problems, bruising, pallor or swollen lymph  nodes   Peripheral Vascular: No calf pain, no circulation deficits. Social History     Socioeconomic History    Marital status:      Spouse name: Not on file    Number of children: Not on file    Years of education: Not on file    Highest education level: Not on file   Occupational History    Not on file   Tobacco Use    Smoking status: Former    Smokeless tobacco: Never   Substance and Sexual Activity    Alcohol use: No    Drug use: No    Sexual activity: Not Currently   Other Topics Concern    Not on file   Social History Narrative    ** Merged History Encounter **          Social Determinants of Health     Financial Resource Strain: Not on file   Food Insecurity: Not on file   Transportation Needs: Not on file   Physical Activity: Not on file   Stress: Not on file   Social Connections: Not on file   Intimate Partner Violence: Not on file   Housing Stability: Not on file        Allergies   Allergen Reactions    Latex Rash    Aspirin Nausea and Vomiting     itching    Vicodin [Hydrocodone-Acetaminophen] Itching        Current Outpatient Medications   Medication Sig    gabapentin (NEURONTIN) 300 mg capsule Take 1 Cap by mouth three (3) times daily. Indications: NEUROPATHIC PAIN    diclofenac EC (VOLTAREN) 75 mg EC tablet take 1 tablet by mouth twice a day    oxyCODONE-acetaminophen (PERCOCET) 7.5-325 mg per tablet Take 1 tab PO every day to TID as needed for pain. DO NOT FILL:5/30/17    amLODIPine (NORVASC) 5 mg tablet 5 mg.     aspirin delayed-release 81 mg tablet 81 mg.    glipiZIDE SR (GLUCOTROL XL) 2.5 mg CR tablet 2.5 mg.    hydroCHLOROthiazide (HYDRODIURIL) 25 mg tablet 25 mg.    pravastatin (PRAVACHOL) 20 mg tablet 40 mg.    BD INSULIN PEN NEEDLE UF MINI 31 gauge x 3/16\" ndle     albuterol (PROVENTIL VENTOLIN) 2.5 mg /3 mL (0.083 %) nebulizer solution     LANTUS SOLOSTAR 100 unit/mL (3 mL) pen inject 60 units subcutaneously at bedtime    MUCINEX 600 mg ER tablet     BYETTA 5 mcg/dose (250 mcg/mL) 1.2 mL injection     CONTOUR METER misc use as directed    BD INSULIN SYRINGE ULTRA-FINE 0.5 mL 31 gauge x 5/16 syrg USE THREE TIMES DAILY AS DIRECTED    BD INSULIN PEN NEEDLE UF ORIG 29 gauge x 1/2\" ndle USE 4 TIMES DAILY    ASCENSIA CONTOUR strip USE 1 STRIP 3 TIMES DAILY    fluticasone (FLONASE) 50 mcg/actuation nasal spray instill 1 spray into each nostril twice a day    polyethylene glycol (MIRALAX) 17 gram/dose powder MIX 17 GM IN  8 OZ OF LIQUID AND DRINL 1 TO 2 TIMES DAILY FOR CONSTIPATION    NOVOLOG FLEXPEN 100 unit/mL inpn     metoprolol succinate (TOPROL-XL) 25 mg XL tablet     ADVAIR DISKUS 250-50 mcg/dose diskus inhaler     atorvastatin (LIPITOR) 20 mg tablet Take 20 mg by mouth daily. PROAIR HFA 90 mcg/actuation inhaler     cetirizine (ZYRTEC) 10 mg tablet Take 10 mg by mouth nightly. montelukast (SINGULAIR) 10 mg tablet Take 10 mg by mouth daily. raNITIdine (ZANTAC) 150 mg tablet Take 150 mg by mouth two (2) times a day. chlorthalidone (HYGROTEN) 25 mg tablet Take 25 mg by mouth daily. ibuprofen (MOTRIN) 200 mg tablet Take  by mouth.    metFORMIN (GLUCOPHAGE) 500 mg tablet Take 500 mg by mouth two (2) times daily (with meals). No current facility-administered medications for this visit.         PHYSICAL EXAMINATION:  Visit Vitals  Temp 96.8 °F (36 °C) (Temporal)   Ht 5' 7\" (1.702 m)   Wt 305 lb (138.3 kg)   BMI 47.77 kg/m²        ORTHO EXAMINATION:  Examination Left knee Right knee   Skin Intact Intact   Range of motion 100-10 100-10   Effusion + -   Medial joint line tenderness + +   Lateral joint line tenderness - -   Popliteal tenderness - -   Osteophytes palpable + +   Tommys - -   Patella crepitus + +   Anterior drawer - -   Lateral laxity - -   Medial laxity - -   Varus deformity + +   Valgus deformity - -   Pretibial edema - -   Calf tenderness - -       RADIOGRAPHS:  XR MALDONADO KNEE 12/14/18 RODNEY  IMPRESSION:  Three views - No fractures, no effusion, severe medial joint space narrowing, + osteophytes present. IKDC Grade D    XR KNEE RIGHT 1/16/15  IMPRESSION:  1. DJD most pronounced medially and patellofemoral joint with large joint effusion. 2. No acute osseous finding. XR LEFT KNEE 11/21/12  IMPRESSION: No acute osseous abnormality. Tricompartmental degenerative  changes. Joint effusion, suspicious for internal derangement. IMPRESSION:      ICD-10-CM ICD-9-CM    1. Primary osteoarthritis of left knee  M17.12 715.16 sodium hyaluronate (SUPARTZ FX/EUFLEXXA/HYALGAN) 10 mg/mL injection syrg 20 mg      DRAIN/INJECT LARGE JOINT/BURSA      REFERRAL TO PHYSICAL THERAPY      2. Primary osteoarthritis of right knee  M17.11 715.16 sodium hyaluronate (SUPARTZ FX/EUFLEXXA/HYALGAN) 10 mg/mL injection syrg 20 mg      DRAIN/INJECT LARGE JOINT/BURSA      REFERRAL TO PHYSICAL THERAPY      3. Chronic pain of right knee  M25.561 719.46 sodium hyaluronate (SUPARTZ FX/EUFLEXXA/HYALGAN) 10 mg/mL injection syrg 20 mg    G89.29 338.29 DRAIN/INJECT LARGE JOINT/BURSA      REFERRAL TO PHYSICAL THERAPY      4. Chronic pain of left knee  M25.562 719.46 sodium hyaluronate (SUPARTZ FX/EUFLEXXA/HYALGAN) 10 mg/mL injection syrg 20 mg    G89.29 338.29 DRAIN/INJECT LARGE JOINT/BURSA      REFERRAL TO PHYSICAL THERAPY      5. Chronic low back pain, unspecified back pain laterality, unspecified whether sciatica present  M54.50 724.2 REFERRAL TO SPINE SURGERY    G89.29 338.29 REFERRAL TO PHYSICAL THERAPY        PLAN:After discussing treatment options, patient's knees were injected with 2 cc Euflexxa. There is no need for surgery. She will follow up in 1 week for Euflexxa #2. She will follow up at the spine center. She will start a brief course of outpatient physical therapy.       Scribed by [facility] ([address]) as dictated by Renato Solis MD

## 2022-09-02 ENCOUNTER — OFFICE VISIT (OUTPATIENT)
Dept: ORTHOPEDIC SURGERY | Age: 62
End: 2022-09-02
Payer: MEDICAID

## 2022-09-02 VITALS — BODY MASS INDEX: 45.99 KG/M2 | WEIGHT: 293 LBS | HEIGHT: 67 IN

## 2022-09-02 DIAGNOSIS — M25.562 CHRONIC PAIN OF LEFT KNEE: ICD-10-CM

## 2022-09-02 DIAGNOSIS — M17.11 PRIMARY OSTEOARTHRITIS OF RIGHT KNEE: ICD-10-CM

## 2022-09-02 DIAGNOSIS — M25.561 CHRONIC PAIN OF RIGHT KNEE: ICD-10-CM

## 2022-09-02 DIAGNOSIS — G89.29 CHRONIC PAIN OF LEFT KNEE: ICD-10-CM

## 2022-09-02 DIAGNOSIS — G89.29 CHRONIC PAIN OF RIGHT KNEE: ICD-10-CM

## 2022-09-02 DIAGNOSIS — M17.12 PRIMARY OSTEOARTHRITIS OF LEFT KNEE: Primary | ICD-10-CM

## 2022-09-02 PROCEDURE — 20610 DRAIN/INJ JOINT/BURSA W/O US: CPT | Performed by: SPECIALIST

## 2022-09-02 NOTE — PROGRESS NOTES
Patient: Hamida Resendiz                MRN: [id number]       SSN: [id number]  YOB: 1960        AGE: 59 y.o. SEX: female  Body mass index is 47.77 kg/m². PCP: Lit Cook MD  09/02/22    CC: BILATERAL KNEE PAIN    HISTORY:  Hamida Resendiz is a 59 y.o. female who is seen for bilateral knee pain. ICD-10-CM ICD-9-CM    1. Primary osteoarthritis of left knee  M17.12 715.16 sodium hyaluronate (SUPARTZ FX/EUFLEXXA/HYALGAN) 10 mg/mL injection syrg 20 mg      DRAIN/INJECT LARGE JOINT/BURSA      2. Primary osteoarthritis of right knee  M17.11 715.16 sodium hyaluronate (SUPARTZ FX/EUFLEXXA/HYALGAN) 10 mg/mL injection syrg 20 mg      DRAIN/INJECT LARGE JOINT/BURSA      3. Chronic pain of right knee  M25.561 719.46 sodium hyaluronate (SUPARTZ FX/EUFLEXXA/HYALGAN) 10 mg/mL injection syrg 20 mg    G89.29 338.29 DRAIN/INJECT LARGE JOINT/BURSA      4. Chronic pain of left knee  M25.562 719.46 sodium hyaluronate (SUPARTZ FX/EUFLEXXA/HYALGAN) 10 mg/mL injection syrg 20 mg    G89.29 338.29 DRAIN/INJECT LARGE JOINT/BURSA        Chart reviewed for the following:   Montana Keller MD, have reviewed the History, Physical and updated the Allergic reactions for [address] performed immediately prior to start of procedure:  Montana Keller MD, have performed the following reviews on Hamida Resendiz prior to the start of the procedure:            * Patient was identified by name and date of birth   * Agreement on procedure being performed was verified  * Risks and Benefits explained to the patient  * Procedure site verified and marked as necessary  * Patient was positioned for comfort  * Consent was obtained     Time: 9:34 AM      Date of procedure: 9/2/2022    Procedure performed by:  Pavan Lee MD    Ms. Chelita Mars tolerated the procedure well with no complications. PLAN:  After discussing treatment options, patient's knees were injected with 2 cc of Euflexxa.   Ms. Chelita Mars will follow up in one week to complete her visco supplementation injection series.       Scribed by Tony Romero ([address] [place] [address] [address]) as dictated by Jasmina Norman MD

## 2022-09-09 ENCOUNTER — OFFICE VISIT (OUTPATIENT)
Dept: ORTHOPEDIC SURGERY | Age: 62
End: 2022-09-09
Payer: MEDICAID

## 2022-09-09 VITALS — WEIGHT: 293 LBS | HEIGHT: 67 IN | BODY MASS INDEX: 45.99 KG/M2

## 2022-09-09 DIAGNOSIS — M25.561 CHRONIC PAIN OF RIGHT KNEE: ICD-10-CM

## 2022-09-09 DIAGNOSIS — G89.29 CHRONIC PAIN OF RIGHT KNEE: ICD-10-CM

## 2022-09-09 DIAGNOSIS — M25.562 CHRONIC PAIN OF LEFT KNEE: ICD-10-CM

## 2022-09-09 DIAGNOSIS — M17.12 PRIMARY OSTEOARTHRITIS OF LEFT KNEE: Primary | ICD-10-CM

## 2022-09-09 DIAGNOSIS — M17.11 PRIMARY OSTEOARTHRITIS OF RIGHT KNEE: ICD-10-CM

## 2022-09-09 DIAGNOSIS — G89.29 CHRONIC PAIN OF LEFT KNEE: ICD-10-CM

## 2022-09-09 PROCEDURE — 20610 DRAIN/INJ JOINT/BURSA W/O US: CPT | Performed by: SPECIALIST

## 2022-09-09 NOTE — PROGRESS NOTES
Patient: Portia Shay                MRN: [id number]       SSN: [id number]  YOB: 1960        AGE: 59 y.o. SEX: female  Body mass index is 47.77 kg/m². PCP: Robert Finn MD  09/09/22    Chief Complaint   Patient presents with    Injection     Bilat knee euflexxa #3     HISTORY:  Portia Shay is a 59 y.o. female who is seen for bilateral knee pain. TIME OUT performed immediately prior to start of procedure:  Kaylene Valdez MD, have performed the following reviews on Portia Shay prior to the start of the procedure:            * Patient was identified by name and date of birth   * Agreement on procedure being performed was verified  * Risks and Benefits explained to the patient  * Procedure site verified and marked as necessary  * Patient was positioned for comfort  * Consent was obtained     Time: 9:45 AM      Date of procedure: 9/9/2022    Procedure performed by:  Mango Soni MD    Ms. Tonio Alicia tolerated the procedure well with no complications      [id number] ICD-9-CM    1. Primary osteoarthritis of left knee  M17.12 715.16 sodium hyaluronate (SUPARTZ FX/EUFLEXXA/HYALGAN) 10 mg/mL injection syrg 20 mg      DRAIN/INJECT LARGE JOINT/BURSA      2. Primary osteoarthritis of right knee  M17.11 715.16 sodium hyaluronate (SUPARTZ FX/EUFLEXXA/HYALGAN) 10 mg/mL injection syrg 20 mg      DRAIN/INJECT LARGE JOINT/BURSA      3. Chronic pain of right knee  M25.561 719.46 sodium hyaluronate (SUPARTZ FX/EUFLEXXA/HYALGAN) 10 mg/mL injection syrg 20 mg    G89.29 338.29 DRAIN/INJECT LARGE JOINT/BURSA      4. Chronic pain of left knee  M25.562 719.46 sodium hyaluronate (SUPARTZ FX/EUFLEXXA/HYALGAN) 10 mg/mL injection syrg 20 mg    G89.29 338.29 DRAIN/INJECT LARGE JOINT/BURSA          PLAN:  After discussing treatment options, patient's knees were injected with 2 cc of Euflexxa. Ms. Tonio Alicia will follow up PRN now that she has completed her visco supplementation injection series.       Scribed by Tena Dc Aaliyah Triplett) as dictated by Igor Aguilar MD

## 2022-09-16 ENCOUNTER — HOSPITAL ENCOUNTER (OUTPATIENT)
Dept: PHYSICAL THERAPY | Age: 62
Discharge: HOME | End: 2022-09-16
Attending: SPECIALIST
Payer: MEDICAID

## 2022-09-16 DIAGNOSIS — G89.29 CHRONIC PAIN OF RIGHT KNEE: ICD-10-CM

## 2022-09-16 DIAGNOSIS — G89.29 CHRONIC PAIN OF LEFT KNEE: ICD-10-CM

## 2022-09-16 DIAGNOSIS — M17.11 PRIMARY OSTEOARTHRITIS OF RIGHT KNEE: ICD-10-CM

## 2022-09-16 DIAGNOSIS — M25.562 CHRONIC PAIN OF LEFT KNEE: ICD-10-CM

## 2022-09-16 DIAGNOSIS — M25.561 CHRONIC PAIN OF RIGHT KNEE: ICD-10-CM

## 2022-09-16 DIAGNOSIS — M17.12 PRIMARY OSTEOARTHRITIS OF LEFT KNEE: Primary | ICD-10-CM

## 2022-09-16 DIAGNOSIS — M54.50 CHRONIC LOW BACK PAIN, UNSPECIFIED BACK PAIN LATERALITY, UNSPECIFIED WHETHER SCIATICA PRESENT: ICD-10-CM

## 2022-09-16 DIAGNOSIS — G89.29 CHRONIC LOW BACK PAIN, UNSPECIFIED BACK PAIN LATERALITY, UNSPECIFIED WHETHER SCIATICA PRESENT: ICD-10-CM

## 2022-09-16 PROCEDURE — 97162 PT EVAL MOD COMPLEX 30 MIN: CPT

## 2022-09-16 NOTE — PROGRESS NOTES
[facility] PHYSICAL THERAPY  [address] [place] [address] [address], [place], [address] - Phone: [phone]  Fax: [phone] / [facility]  Patient Name: Marcelina Larkin : 1960   Medical   Diagnosis: Primary osteoarthritis of left knee [M17.12]  Primary osteoarthritis of right knee [M17.11]  Chronic pain of right knee [M25.561, G89.29]  Chronic pain of left knee [M25.562, G89.29]  Chronic low back pain, unspecified back pain laterality, unspecified whether sciatica present [M54.50, G89.29] Treatment Diagnosis: Bilateral knee pain, LBP   Onset Date: Chronic     Referral Source: Andrea Jones MD [facility]): 2022   Prior Hospitalization: See medical history Provider #: [id number]   Prior Level of Function: Independent   Comorbidities: DM, HTN, asthma   Medications: Verified on Patient Summary List   The Plan of Care and following information is based on the information from the initial evaluation.   ==========================================================================================  Assessment / key information:  Pt is a 57year old female who presents to PT today with c/o lower back pain and bilateral knee pain. DOI: chronic. DALE: none. She reports getting gel injections last week to bilateral knees which provided 50% relief. Aggravating factors for knees include walking >5 minutes and sitting for prolonged duration. For LBP, describes pain constant dull ache. Pain radiates into bilateral hips and anterior thighs R>L. Lumbar flexion is concordant sign with referral of symptoms down anterior lateral RLE. Limited knee AROM with pain at end ranges. Utilizes [facility] for community ambulation. Does not have stairs. Alleviating factors include Percocet and laying down. She reports having PT for knee pain previously.       Symptoms  Pain              Today:7/10              Best:4/10 Worst:10/10    Observation/Posture: Pt with impaired bed mobility; difficulty with supine to sit transition. Pt demonstrates frequent weight shifting in seated position      Gait:    [] Normal       [x] Abnormal:slower costa, wide CLOVER, mild increase in hip IR, ambulates with SPC     5x sit<>stand:deferred   Single leg stance:deferred      BLE ROM / Strength  [] Unable to assess                  AROM          Strength (1-5)      Left Right Left Right   Hip Flexion     2+ 2+   Knee Flexion 105 p! 105* 4 4     Extension 10* lag 10* lag 4 4   Ankle Plantarflexion     4 4     Dorsiflexion     4 4      Lumbar spine Active Movements:   ROM  AROM Comments:pain, area   Forward flexion 40-60 Mod limited  Concordant sign, significant pain   Extension 20-30 Significantly  limited  Mild pain   SB right 20-30 Mildly limited      SB left 20-30 Mildly limited     Rotation right 5-10 Not tested     Rotation left 5-10 Not tested         Dural Mobility:       Slump Test: unable to assume position    KNEE: Patient with a Functional Status score of 34  on FOTO (Focused on Therapeutic Outcomes), which corresponds to a functional limitation of 66%. LOWER BACK: Patient with a Functional Status score of 31  on FOTO (Focused on Therapeutic Outcomes), which corresponds to a functional limitation of 69%. Pt was provided a HEP today and educated regarding their diagnosis and prognosis. Clinically, patient's signs and symptoms are consistent with bilateral knee and lower back pain. Skilled physical therapy is indicated to address noted deficits. Rehabilitation will address limitations noted in evaluation, follow MD orders and work toward improving BLE, lumbar and core strength, stability, and mobility so that patient may return to desired activities safely and with less discomfort. Thank you for the opportunity to assist in this case. ==========================================================================================  Eval Complexity: History: HIGH Complexity :3+ comorbidities / personal factors will impact the outcome/ POC Exam: Presentation: MEDIUM Complexity : Evolving with changing characteristics  Clinical Decision Making:HIGH Complexity : FOTO score of 1- 25 Overall Complexity:MEDIUM    Problem List: pain affecting function, decrease ROM, decrease strength, impaired gait/ balance, decrease ADL/ functional abilitiies, decrease activity tolerance, decrease flexibility/ joint mobility, and decrease transfer abilities   Treatment Plan may include any combination of the following: Therapeutic exercise, Therapeutic activities, Neuromuscular re-education, Physical agent/modality, Gait/balance training, Manual therapy, Patient education, Self Care training, Functional mobility training, Home safety training, and Stair training  Patient / Family readiness to learn indicated by: asking questions, trying to perform skills, and interest  Persons(s) to be included in education: patient (P)  Barriers to Learning/Limitations: None  Patient Goal (s): To reduce pain   Patient self reported health status: fair  Rehabilitation Potential: good      Short Term Goals: To be accomplished in  3  weeks:  Goal/Measure of Progress Goal Met? 1. Pt will be compliant with HEP for symptom management at home. Status at last Eval: NA Current Status: Unable n/a   2. Patient will demonstrate improved bilateral knee extension to 0* bilaterally to aide in gait mechanics. Status at last Eval: NA Current Status: 10* lag p! n/a   3. Patient will demonstrate improved knee flexion bilaterally to at least 115* to aide in stair negotiation. Status at last Eval: NA Current Status: 105* p! n/a     Long Term Goals: To be accomplished in  6  weeks:  Goal/Measure of Progress Goal Met? 1. Pt will be independent with HEP at D/C for self management.    Status at last Eval: NA Current Status: Unable n/a   2. Patient will demonstrate improved hip flexion strength to at least 4/5 to improve her ambulation tolerance. Status at last Eval: NA Current Status: 2+/5 bilaterally n/a   3. Patient will demonstrate improved ability to transition from supine to sit with safe mechanics and minimal reproduction of pain. Status at last Eval: NA Current Status: Pain n/a   4. Patient will increase FOTO Functional Status score to 43 to decrease functional limitations. Status at last Eval: NA Current Status: 31 n/a       Frequency / Duration:   Patient to be seen  2  times per week for 6  weeks:  Patient / Caregiver education and instruction: provided education regarding diagnosis and prognosis as well as details regarding treatment plain. self care, activity modification, and exercises  Therapist Signature: Pb Uribe PT, DPT Date: 5/21/2672   Certification Period: 9/16/2022 - 12/15/2022 Time: 8:59 AM   ===========================================================================================  I certify that the above Physical Therapy Services are being furnished while the patient is under my care. I agree with the treatment plan and certify that this therapy is necessary. Physician Signature:        Date:       Time:     Please sign and return to [facility] or you may fax the signed copy to [phone]. Thank you.   Zoe Galvez MD

## 2022-10-03 ENCOUNTER — APPOINTMENT (OUTPATIENT)
Dept: PHYSICAL THERAPY | Age: 62
End: 2022-10-03
Attending: SPECIALIST
Payer: MEDICAID

## 2022-10-05 ENCOUNTER — HOSPITAL ENCOUNTER (OUTPATIENT)
Dept: PHYSICAL THERAPY | Age: 62
Discharge: HOME | End: 2022-10-05
Attending: SPECIALIST
Payer: MEDICAID

## 2022-10-05 PROCEDURE — 97530 THERAPEUTIC ACTIVITIES: CPT

## 2022-10-05 PROCEDURE — 97110 THERAPEUTIC EXERCISES: CPT

## 2022-10-05 PROCEDURE — 97112 NEUROMUSCULAR REEDUCATION: CPT

## 2022-10-05 PROCEDURE — 97116 GAIT TRAINING THERAPY: CPT

## 2022-10-05 NOTE — PROGRESS NOTES
PHYSICAL THERAPY - DAILY TREATMENT NOTE    Patient Name: Malou Harrison        Date: 10/5/2022  : 1960   yes Patient  Verified  Visit #:     Insurance: Payor: Rosalinda Haile / Plan: Sadi Barlow / Product Type: Managed Care Medicaid /      In time: 089 Out time: 059   Total Treatment Time: 39     Medicare/BCBS Time Tracking (below)   Total Timed Codes (min):  39 1:1 Treatment Time:  39     TREATMENT AREA =  Left knee pain [M25.562]  Right knee pain [M25.561]  Other low back pain [M54.59]    SUBJECTIVE  Pain Level (on 0 to 10 scale):  8  / 10 c amb, 0/10 sitting   Medication Changes/New allergies or changes in medical history, any new surgeries or procedures?    no  If yes, update Summary List   Subjective Functional Status/Changes:  []  No changes reported     \"I am really hurting today and not feeling well bc I got the covid shot yesterday. It had my head hurting all night\"    \"my knees are sore, I road back from [place] yesterday.  I had to sit around a lot there bc I can not get around good\"     OBJECTIVE    13 min Therapeutic Exercise:  [x]  See flow sheet   Rationale:      increase ROM, increase strength, and improve coordination to improve the patients ability to  safely perform ADLs/transfers/squatting/prolong stding and ambulation/stair negotiation with greater ease       10 min Therapeutic Activity: [x]  See flow sheet   Rationale:    increase ROM, increase strength, and improve coordination to improve the patients ability to  safely perform ADLs/transfers/squatting/prolong stding and ambulation/stair negotiation with greater ease      8 min Neuromuscular Re-ed: [x]  See flow sheet   Rationale:    increase ROM, increase strength, and improve coordination to improve the patients ability to  safely perform ADLs/transfers/squatting/prolong stding and ambulation/stair negotiation with greater ease      8 min Gait Training:  CGA>SBA for amb x 60' x with an emphasis on symmetrical WBing and heel strike and decrease trunk sway   Rationale: To improve ambulation safety and efficiency in order to improve patient's ability to safely ambulate at home for self care. Billed With/As:   [x] TE   [x] TA   [x] Neuro   [] Self Care Patient Education: [x] Review HEP    [] Progressed/Changed HEP based on:   [x] positioning   [x] body mechanics   [x] transfers   [] heat/ice application    [x] other: Pt ed on importance and benefits of compliance with HEP, core strength/stability and proper posture; pt verbalized understanding       Other Objective/Functional Measures:  Pt with a significant antalgic gait, and using wall support entering clinic. Pt stated her cane was forgotten. Pt with decrease foot clearance and increase lateral trunk sway    VCs + demo to perform proper technique and for safety with TE  Initiated TE per flowsheet  c/o p! with all TE, subsides with rest  difficulty sitting upright  demos decrease LE strength      Post Treatment Pain Level (on 0 to 10) scale:   5  / 10     ASSESSMENT  Assessment/Changes in Function:     improved gait after instruction and training, advised pt to use AD to relieve pain and promote safety; pt verbalized understanding    limited by pain     []  See Progress Note/Recertification   Patient will continue to benefit from skilled PT services to modify and progress therapeutic interventions, address functional mobility deficits, address ROM deficits, address strength deficits, analyze and address soft tissue restrictions, analyze and cue movement patterns, analyze and modify body mechanics/ergonomics, assess and modify postural abnormalities, and instruct in home and community integration to attain remaining goals.    Progress toward goals / Updated goals:    Pt's first visit since [facility], no noted progress        PLAN  [x]  Upgrade activities as tolerated yes Continue plan of care   []  Discharge due to :    []  Other:      Therapist: Shayan Johnson, JOAQUÍN    Date: 10/5/2022 Time: 9:01 AM     Future Appointments   Date Time Provider Ra Jasmina   10/10/2022  8:45 AM Melissa Chapin, PT [facility]   10/12/2022  8:00 AM Melissa Chapin, PT [facility]   10/17/2022  8:00 AM Romulo Deleon, PTA [facility]   10/19/2022  8:00 AM Kaylyn Jain, PT [facility]   10/31/2022  8:00 AM Melissa Chapin, PT [facility]

## 2022-10-10 ENCOUNTER — APPOINTMENT (OUTPATIENT)
Dept: PHYSICAL THERAPY | Age: 62
End: 2022-10-10
Attending: SPECIALIST
Payer: MEDICAID

## 2022-10-12 ENCOUNTER — APPOINTMENT (OUTPATIENT)
Dept: PHYSICAL THERAPY | Age: 62
End: 2022-10-12
Attending: SPECIALIST
Payer: MEDICAID

## 2022-10-17 ENCOUNTER — APPOINTMENT (OUTPATIENT)
Dept: PHYSICAL THERAPY | Age: 62
End: 2022-10-17
Attending: SPECIALIST
Payer: MEDICAID

## 2022-10-17 ENCOUNTER — HOSPITAL ENCOUNTER (OUTPATIENT)
Dept: PHYSICAL THERAPY | Age: 62
End: 2022-10-17
Attending: SPECIALIST
Payer: MEDICAID

## 2022-10-19 ENCOUNTER — APPOINTMENT (OUTPATIENT)
Dept: PHYSICAL THERAPY | Age: 62
End: 2022-10-19
Attending: SPECIALIST
Payer: MEDICAID

## 2022-10-31 ENCOUNTER — APPOINTMENT (OUTPATIENT)
Dept: PHYSICAL THERAPY | Age: 62
End: 2022-10-31
Attending: SPECIALIST
Payer: MEDICAID

## 2022-12-14 NOTE — PROGRESS NOTES
Dear Dr. Shazia Ryan MD, under your direction, we have been providing physical therapy for your patient Carmen Lloyd, 1960 for a diagnosis of Left knee pain [M25.562]  Right knee pain [M25.561]  Other low back pain [M54.59]. The patient attended Eval and 1 additional visit. Pt missed 1 visit and came in with significantly high BS. Pt was last seen on 10/26/22. Pt informed she will be d/c by 11/11/12 if she had not scheduled for PT. On the last visit they reported 8/10 p! level and stated, \"I am really hurting today and not feeling well bc I got the covid shot yesterday. It had my head hurting all night\"    \"my knees are sore, I road back from [facility] yesterday. I had to sit around a lot there bc I can not get around good\"    Due to the inability to further their care from non-attendance, we are discharging the patient from physical therapy at this time. We appreciate the kind referral and would willingly work with this patient again, should they be able to arrange regular attendance. Your patient's health is our primary concern. Should you have any further questions or concerns, please feel free to contact me at your convenience. Sincerely,     Tiffany Scott PTA                   12/14/2022        LPTA Signature: Tiffany Scott PTA Date: 12/14/22 late entry   Therapist Signature: David Palmer DPT, CLT Time: 4:11 PM           NOTE TO PHYSICIAN:  PLEASE COMPLETE THE ORDERS BELOW AND FAX TO   [facility] Physical [facility]: [phone]. If you are unable to process this request in 24 hours please contact our office: [phone].    ___ I have read the above report and request that my patient continue as recommended.   ___ I have read the above report and request that my patient continue therapy with the following changes/special instructions:_________________________________________________________   ___ I have read the above report and request that my patient be discharged from therapy. Physician Signature:        Date:       Time:              Marilou Lemus MD.

## 2024-12-12 NOTE — PATIENT INSTRUCTIONS
Zaheer Mcginnis [employer] Activation    Thank you for requesting access to [employer]. Please follow the instructions below to securely access and download your online medical record. [employer] allows you to send messages to your doctor, view your test results, renew your prescriptions, schedule appointments, and more. How Do I Sign Up? 1. In your internet browser, go to [email or web address]  2. Click on the First Time User? Click Here link in the Sign In box. You will be redirect to the New Member Sign Up page. 3. Enter your Zimory Access Code exactly as it appears below. You will not need to use this code after youve completed the sign-up process. If you do not sign up before the expiration date, you must request a new code. Zimory Access Code: [email or web address]  Expires: 3/1/2017  8:32 AM (This is the date your Zimory access code will )    4. Enter the last four digits of your Social Security Number (xxxx) and Date of Birth (mm/dd/yyyy) as indicated and click Submit. You will be taken to the next sign-up page. 5. Create a Zimory ID. This will be your [employer]imory login ID and cannot be changed, so think of one that is secure and easy to remember. 6. Create a Zimory password. You can change your password at any time. 7. Enter your Password Reset Question and Answer. This can be used at a later time if you forget your password. 8. Enter your e-mail address. You will receive e-mail notification when new information is available in [address]. 9. Click Sign Up. You can now view and download portions of your medical record. 10. Click the Download Summary menu link to download a portable copy of your medical information. Additional Information    If you have questions, please visit the Frequently Asked Questions section of the [employer] website at [email or web address] Remember, [employer]ory is NOT to be used for urgent needs. For medical emergencies, dial 911.
Stable